# Patient Record
Sex: MALE | Race: WHITE | NOT HISPANIC OR LATINO | URBAN - METROPOLITAN AREA
[De-identification: names, ages, dates, MRNs, and addresses within clinical notes are randomized per-mention and may not be internally consistent; named-entity substitution may affect disease eponyms.]

---

## 2017-11-02 ENCOUNTER — EMERGENCY (EMERGENCY)
Facility: HOSPITAL | Age: 82
LOS: 1 days | Discharge: HOME | End: 2017-11-02
Attending: EMERGENCY MEDICINE | Admitting: EMERGENCY MEDICINE
Payer: COMMERCIAL

## 2017-11-02 VITALS
HEART RATE: 78 BPM | SYSTOLIC BLOOD PRESSURE: 182 MMHG | TEMPERATURE: 98 F | DIASTOLIC BLOOD PRESSURE: 80 MMHG | OXYGEN SATURATION: 98 % | RESPIRATION RATE: 16 BRPM

## 2017-11-02 LAB
ALBUMIN SERPL ELPH-MCNC: 3.9 G/DL — SIGNIFICANT CHANGE UP (ref 3.3–5)
ALP SERPL-CCNC: 76 U/L — SIGNIFICANT CHANGE UP (ref 40–120)
ALT FLD-CCNC: 12 U/L RC — SIGNIFICANT CHANGE UP (ref 10–45)
ANION GAP SERPL CALC-SCNC: 15 MMOL/L — SIGNIFICANT CHANGE UP (ref 5–17)
APPEARANCE UR: ABNORMAL
AST SERPL-CCNC: 21 U/L — SIGNIFICANT CHANGE UP (ref 10–40)
BACTERIA # UR AUTO: ABNORMAL /HPF
BASOPHILS # BLD AUTO: 0.3 K/UL — HIGH (ref 0–0.2)
BILIRUB SERPL-MCNC: 0.2 MG/DL — SIGNIFICANT CHANGE UP (ref 0.2–1.2)
BILIRUB UR-MCNC: NEGATIVE — SIGNIFICANT CHANGE UP
BUN SERPL-MCNC: 33 MG/DL — HIGH (ref 7–23)
CALCIUM SERPL-MCNC: 9.2 MG/DL — SIGNIFICANT CHANGE UP (ref 8.4–10.5)
CHLORIDE SERPL-SCNC: 101 MMOL/L — SIGNIFICANT CHANGE UP (ref 96–108)
CO2 SERPL-SCNC: 23 MMOL/L — SIGNIFICANT CHANGE UP (ref 22–31)
COLOR SPEC: YELLOW — SIGNIFICANT CHANGE UP
CREAT SERPL-MCNC: 2.05 MG/DL — HIGH (ref 0.5–1.3)
DIFF PNL FLD: ABNORMAL
EOSINOPHIL # BLD AUTO: 0.3 K/UL — SIGNIFICANT CHANGE UP (ref 0–0.5)
EOSINOPHIL NFR BLD AUTO: 1 % — SIGNIFICANT CHANGE UP (ref 0–6)
GLUCOSE SERPL-MCNC: 109 MG/DL — HIGH (ref 70–99)
GLUCOSE UR QL: NEGATIVE — SIGNIFICANT CHANGE UP
HCT VFR BLD CALC: 30.5 % — LOW (ref 39–50)
HGB BLD-MCNC: 10.3 G/DL — LOW (ref 13–17)
KETONES UR-MCNC: NEGATIVE — SIGNIFICANT CHANGE UP
LEUKOCYTE ESTERASE UR-ACNC: ABNORMAL
LYMPHOCYTES # BLD AUTO: 55 % — HIGH (ref 13–44)
LYMPHOCYTES # BLD AUTO: 9.2 K/UL — HIGH (ref 1–3.3)
MCHC RBC-ENTMCNC: 33.4 PG — SIGNIFICANT CHANGE UP (ref 27–34)
MCHC RBC-ENTMCNC: 33.9 GM/DL — SIGNIFICANT CHANGE UP (ref 32–36)
MCV RBC AUTO: 98.5 FL — SIGNIFICANT CHANGE UP (ref 80–100)
MONOCYTES # BLD AUTO: 1 K/UL — HIGH (ref 0–0.9)
MONOCYTES NFR BLD AUTO: 3 % — SIGNIFICANT CHANGE UP (ref 2–14)
NEUTROPHILS # BLD AUTO: 5.5 K/UL — SIGNIFICANT CHANGE UP (ref 1.8–7.4)
NEUTROPHILS NFR BLD AUTO: 35 % — LOW (ref 43–77)
NITRITE UR-MCNC: NEGATIVE — SIGNIFICANT CHANGE UP
PH UR: 6 — SIGNIFICANT CHANGE UP (ref 5–8)
PLATELET # BLD AUTO: 396 K/UL — SIGNIFICANT CHANGE UP (ref 150–400)
POTASSIUM SERPL-MCNC: 4.1 MMOL/L — SIGNIFICANT CHANGE UP (ref 3.5–5.3)
POTASSIUM SERPL-SCNC: 4.1 MMOL/L — SIGNIFICANT CHANGE UP (ref 3.5–5.3)
PROT SERPL-MCNC: 7 G/DL — SIGNIFICANT CHANGE UP (ref 6–8.3)
PROT UR-MCNC: 100 MG/DL
RBC # BLD: 3.09 M/UL — LOW (ref 4.2–5.8)
RBC # FLD: 12.3 % — SIGNIFICANT CHANGE UP (ref 10.3–14.5)
RBC CASTS # UR COMP ASSIST: ABNORMAL /HPF (ref 0–2)
SODIUM SERPL-SCNC: 139 MMOL/L — SIGNIFICANT CHANGE UP (ref 135–145)
SP GR SPEC: 1.01 — SIGNIFICANT CHANGE UP (ref 1.01–1.02)
UROBILINOGEN FLD QL: NEGATIVE — SIGNIFICANT CHANGE UP
WBC # BLD: 16.3 K/UL — HIGH (ref 3.8–10.5)
WBC # FLD AUTO: 16.3 K/UL — HIGH (ref 3.8–10.5)
WBC UR QL: ABNORMAL /HPF (ref 0–5)

## 2017-11-02 PROCEDURE — 99284 EMERGENCY DEPT VISIT MOD MDM: CPT

## 2017-11-02 RX ORDER — TAMSULOSIN HYDROCHLORIDE 0.4 MG/1
1 CAPSULE ORAL
Qty: 14 | Refills: 0 | OUTPATIENT
Start: 2017-11-02 | End: 2017-11-16

## 2017-11-02 RX ORDER — CEPHALEXIN 500 MG
1 CAPSULE ORAL
Qty: 28 | Refills: 0 | OUTPATIENT
Start: 2017-11-02 | End: 2017-11-16

## 2017-11-02 RX ORDER — TAMSULOSIN HYDROCHLORIDE 0.4 MG/1
0.4 CAPSULE ORAL AT BEDTIME
Qty: 0 | Refills: 0 | Status: DISCONTINUED | OUTPATIENT
Start: 2017-11-02 | End: 2017-11-11

## 2017-11-02 RX ADMIN — TAMSULOSIN HYDROCHLORIDE 0.4 MILLIGRAM(S): 0.4 CAPSULE ORAL at 20:14

## 2017-11-02 NOTE — ED ADULT NURSE NOTE - OBJECTIVE STATEMENT
88 yo m pmh of HTN, hypothyroidism, HLD, prostate CA came to ED c/o dysuria starting at 4 pm this afternoon.  As per daughter, pt has been having problem of dribbling, incontinence, and dysuria when trying to void for a month, had a diagnosis of UTI and on medication for about two weeks, and had lost 7 lbs over the past month. 89 yo m pmh of HTN, hypothyroidism, HLD, prostate CA with seed implant procedure came to ED c/o dysuria starting at 4 pm this afternoon.  As per daughter, pt has been having problem of dribbling, incontinence, and dysuria when trying to void for a month, had a diagnosis of UTI and on medication for about two weeks, and had lost 7 lbs over the past month.  Denies CP, back pain, SOB, fevers/chills, n/v/d, changes in bowel habits.  A&Ox4, abdomen soft, nondistended, nontender.  Bowel sounds present x 4 quadrants.  Lungs clear.  skin w/d/i.  safety and comfort maintained.  Daughter present at bedside.  Will continue to monitor.

## 2017-11-02 NOTE — ED PROVIDER NOTE - ATTENDING CONTRIBUTION TO CARE
88yo M with h/o HTN HLD prostate CA c/o difficulty urinating x 1 month; on ABx, no longer taking flomax.  Well appearing, PVR by bladder scan 0.  Likely cystitis; stable for dc with continued w/u as outpatient.

## 2017-11-02 NOTE — ED ADULT NURSE REASSESSMENT NOTE - NS ED NURSE REASSESS COMMENT FT1
Patient attempted to void, small amount of urine collected and sent to lab for UA. Post void bladder scan showed 0cc urine. Pt still feels the need to void. Family at bedside requesting to go home and follow up with urologist on Monday. MD Garcia aware and speaking with patient and family about plan of care.

## 2017-11-02 NOTE — ED PROVIDER NOTE - NS ED ROS FT
Constitutional: no fever, no chills.  Eyes: no visual changes.  ENMT: no sore throat.  CV: no chest pain.  Resp: no cough, no shortness of breath.  GI: +abdominal pain, no nausea, no vomiting, no diarrhea.  : +dysuria, no hematuria.  MSK: no back pain, no neck pain.  Skin: no rashes.  Neuro: no headache, no loss of consciousness, no weakness, no numbness, no tingling.  Psych: no known mental health issues.  Endo: no diabetes, +thyroid trouble.

## 2017-11-02 NOTE — ED PROVIDER NOTE - PROGRESS NOTE DETAILS
Resident: bladder scan negative x 2. Patient voiding freely here in department, though does have incontinence. Will dc with flomax, keflex, x 2wks, will follow-up with his urologist on Monday.

## 2017-11-02 NOTE — ED PROVIDER NOTE - OBJECTIVE STATEMENT
Resident: 89y M PMH HTN, HLD, hypothyroid, prostate ca s/p seed implant 10yrs ago presents with urinary retention x 1 month. Patient reports he cannot urinate when he has the urge. Patient has urinary incontinence overnight. Went to PMD on Tuesday, diagnosed with UTI, started on cipro. Endorses 7lb weight loss over the past month. Patient used to be on flomax but is no longer taking it. Had appt with Uro on Monday.    PMD Steffany Hernandez; Uro Nish Maddox ([place])

## 2017-11-02 NOTE — ED PROVIDER NOTE - PHYSICAL EXAMINATION
Resident: Gen: thin, elderly, no acute distress; Head: NC, AT; ENT: PERRL, dry mucous membranes; Chest: CTAB, no retractions, rate normal, appears to breathe comfortably; Heart: RRR S1S2 No peripheral edema b/l pulses 2+ in arms and legs; Abd: Soft non-tender, no rebound or guarding, +suprapubic mass; Back: No spinal deformity; Ext: Moving all 4 extremities without obvious impairment to ROM, no obvious weakness; Neuro: fluid speech; Psych: No anxiety, depression or pressured speech noted; Skin: no urticaria, no diffuse rash.

## 2017-11-02 NOTE — ED ADULT NURSE NOTE - CHPI ED SYMPTOMS NEG
no fever/no diarrhea/no vomiting/no blood in stool/no nausea/no hematuria/no abdominal distension/no chills

## 2017-11-02 NOTE — ED ADULT NURSE REASSESSMENT NOTE - NS ED NURSE REASSESS COMMENT FT1
Bladder scan performed.  Pt tolerated well.  scan showed 0 cc rentention.  MD Garcia made aware and d/c indwelling catheter.  Will continue to monitor. Bladder scan performed.  Pt tolerated well.  scan showed 0 cc rentention.  MD Garcia made aware and d/c order for indwelling catheter.  Pt provided with urine cup, and aware of need for sample. Will continue to monitor.

## 2017-11-03 VITALS
RESPIRATION RATE: 16 BRPM | OXYGEN SATURATION: 99 % | TEMPERATURE: 98 F | DIASTOLIC BLOOD PRESSURE: 76 MMHG | HEART RATE: 67 BPM | SYSTOLIC BLOOD PRESSURE: 130 MMHG

## 2017-11-03 PROCEDURE — 87086 URINE CULTURE/COLONY COUNT: CPT

## 2017-11-03 PROCEDURE — 99283 EMERGENCY DEPT VISIT LOW MDM: CPT | Mod: 25

## 2017-11-03 PROCEDURE — 80053 COMPREHEN METABOLIC PANEL: CPT

## 2017-11-03 PROCEDURE — 81001 URINALYSIS AUTO W/SCOPE: CPT

## 2017-11-03 PROCEDURE — 85027 COMPLETE CBC AUTOMATED: CPT

## 2017-11-04 ENCOUNTER — INPATIENT (INPATIENT)
Facility: HOSPITAL | Age: 82
LOS: 4 days | Discharge: HOME HEALTH | DRG: 872 | End: 2017-11-09
Attending: INTERNAL MEDICINE | Admitting: INTERNAL MEDICINE
Payer: COMMERCIAL

## 2017-11-04 VITALS
DIASTOLIC BLOOD PRESSURE: 50 MMHG | SYSTOLIC BLOOD PRESSURE: 136 MMHG | TEMPERATURE: 98 F | RESPIRATION RATE: 19 BRPM | HEART RATE: 73 BPM | OXYGEN SATURATION: 100 %

## 2017-11-04 DIAGNOSIS — R55 SYNCOPE AND COLLAPSE: ICD-10-CM

## 2017-11-04 LAB
ALBUMIN SERPL ELPH-MCNC: 3.7 G/DL — SIGNIFICANT CHANGE UP (ref 3.3–5)
ALP SERPL-CCNC: 71 U/L — SIGNIFICANT CHANGE UP (ref 40–120)
ALT FLD-CCNC: 12 U/L RC — SIGNIFICANT CHANGE UP (ref 10–45)
ANION GAP SERPL CALC-SCNC: 16 MMOL/L — SIGNIFICANT CHANGE UP (ref 5–17)
AST SERPL-CCNC: 23 U/L — SIGNIFICANT CHANGE UP (ref 10–40)
BASE EXCESS BLDV CALC-SCNC: -3.3 MMOL/L — LOW (ref -2–2)
BASOPHILS # BLD AUTO: 0.2 K/UL — SIGNIFICANT CHANGE UP (ref 0–0.2)
BILIRUB SERPL-MCNC: 0.2 MG/DL — SIGNIFICANT CHANGE UP (ref 0.2–1.2)
BUN SERPL-MCNC: 40 MG/DL — HIGH (ref 7–23)
CA-I SERPL-SCNC: 1.16 MMOL/L — SIGNIFICANT CHANGE UP (ref 1.12–1.3)
CALCIUM SERPL-MCNC: 8.5 MG/DL — SIGNIFICANT CHANGE UP (ref 8.4–10.5)
CHLORIDE BLDV-SCNC: 105 MMOL/L — SIGNIFICANT CHANGE UP (ref 96–108)
CHLORIDE SERPL-SCNC: 103 MMOL/L — SIGNIFICANT CHANGE UP (ref 96–108)
CO2 BLDV-SCNC: 24 MMOL/L — SIGNIFICANT CHANGE UP (ref 22–30)
CO2 SERPL-SCNC: 21 MMOL/L — LOW (ref 22–31)
CREAT SERPL-MCNC: 2.43 MG/DL — HIGH (ref 0.5–1.3)
CULTURE RESULTS: SIGNIFICANT CHANGE UP
EOSINOPHIL # BLD AUTO: 0 K/UL — SIGNIFICANT CHANGE UP (ref 0–0.5)
GAS PNL BLDV: 137 MMOL/L — SIGNIFICANT CHANGE UP (ref 136–145)
GAS PNL BLDV: SIGNIFICANT CHANGE UP
GAS PNL BLDV: SIGNIFICANT CHANGE UP
GLUCOSE BLDV-MCNC: 117 MG/DL — HIGH (ref 70–99)
GLUCOSE SERPL-MCNC: 124 MG/DL — HIGH (ref 70–99)
HCO3 BLDV-SCNC: 23 MMOL/L — SIGNIFICANT CHANGE UP (ref 21–29)
HCT VFR BLD CALC: 26.8 % — LOW (ref 39–50)
HCT VFR BLDA CALC: 30 % — LOW (ref 39–50)
HGB BLD CALC-MCNC: 9.7 G/DL — LOW (ref 13–17)
HGB BLD-MCNC: 9.2 G/DL — LOW (ref 13–17)
LACTATE BLDV-MCNC: 2.7 MMOL/L — HIGH (ref 0.7–2)
LYMPHOCYTES # BLD AUTO: 55 % — HIGH (ref 13–44)
LYMPHOCYTES # BLD AUTO: 9.3 K/UL — HIGH (ref 1–3.3)
MCHC RBC-ENTMCNC: 33.9 PG — SIGNIFICANT CHANGE UP (ref 27–34)
MCHC RBC-ENTMCNC: 34.3 GM/DL — SIGNIFICANT CHANGE UP (ref 32–36)
MCV RBC AUTO: 99 FL — SIGNIFICANT CHANGE UP (ref 80–100)
MONOCYTES # BLD AUTO: 1.2 K/UL — HIGH (ref 0–0.9)
MONOCYTES NFR BLD AUTO: 5 % — SIGNIFICANT CHANGE UP (ref 2–14)
NEUTROPHILS # BLD AUTO: 5.4 K/UL — SIGNIFICANT CHANGE UP (ref 1.8–7.4)
NEUTROPHILS NFR BLD AUTO: 33 % — LOW (ref 43–77)
PCO2 BLDV: 48 MMHG — SIGNIFICANT CHANGE UP (ref 35–50)
PH BLDV: 7.3 — LOW (ref 7.35–7.45)
PLATELET # BLD AUTO: 354 K/UL — SIGNIFICANT CHANGE UP (ref 150–400)
PO2 BLDV: 21 MMHG — LOW (ref 25–45)
POTASSIUM BLDV-SCNC: 3.7 MMOL/L — SIGNIFICANT CHANGE UP (ref 3.5–5)
POTASSIUM SERPL-MCNC: 4.2 MMOL/L — SIGNIFICANT CHANGE UP (ref 3.5–5.3)
POTASSIUM SERPL-SCNC: 4.2 MMOL/L — SIGNIFICANT CHANGE UP (ref 3.5–5.3)
PROT SERPL-MCNC: 6.4 G/DL — SIGNIFICANT CHANGE UP (ref 6–8.3)
RBC # BLD: 2.71 M/UL — LOW (ref 4.2–5.8)
RBC # FLD: 12.3 % — SIGNIFICANT CHANGE UP (ref 10.3–14.5)
SAO2 % BLDV: 21 % — LOW (ref 67–88)
SODIUM SERPL-SCNC: 140 MMOL/L — SIGNIFICANT CHANGE UP (ref 135–145)
SPECIMEN SOURCE: SIGNIFICANT CHANGE UP
WBC # BLD: 16.2 K/UL — HIGH (ref 3.8–10.5)
WBC # FLD AUTO: 16.2 K/UL — HIGH (ref 3.8–10.5)

## 2017-11-04 PROCEDURE — 99223 1ST HOSP IP/OBS HIGH 75: CPT

## 2017-11-04 PROCEDURE — 93010 ELECTROCARDIOGRAM REPORT: CPT

## 2017-11-04 PROCEDURE — 99285 EMERGENCY DEPT VISIT HI MDM: CPT | Mod: 25

## 2017-11-04 RX ORDER — ATORVASTATIN CALCIUM 80 MG/1
10 TABLET, FILM COATED ORAL AT BEDTIME
Qty: 0 | Refills: 0 | Status: DISCONTINUED | OUTPATIENT
Start: 2017-11-04 | End: 2017-11-09

## 2017-11-04 RX ORDER — TAMSULOSIN HYDROCHLORIDE 0.4 MG/1
0.4 CAPSULE ORAL AT BEDTIME
Qty: 0 | Refills: 0 | Status: DISCONTINUED | OUTPATIENT
Start: 2017-11-04 | End: 2017-11-09

## 2017-11-04 RX ORDER — SODIUM CHLORIDE 9 MG/ML
1000 INJECTION INTRAMUSCULAR; INTRAVENOUS; SUBCUTANEOUS ONCE
Qty: 0 | Refills: 0 | Status: DISCONTINUED | OUTPATIENT
Start: 2017-11-04 | End: 2017-11-04

## 2017-11-04 RX ORDER — SODIUM CHLORIDE 9 MG/ML
1000 INJECTION INTRAMUSCULAR; INTRAVENOUS; SUBCUTANEOUS ONCE
Qty: 0 | Refills: 0 | Status: COMPLETED | OUTPATIENT
Start: 2017-11-04 | End: 2017-11-04

## 2017-11-04 RX ORDER — LEVOTHYROXINE SODIUM 125 MCG
100 TABLET ORAL DAILY
Qty: 0 | Refills: 0 | Status: DISCONTINUED | OUTPATIENT
Start: 2017-11-04 | End: 2017-11-09

## 2017-11-04 RX ORDER — CEFTRIAXONE 500 MG/1
1 INJECTION, POWDER, FOR SOLUTION INTRAMUSCULAR; INTRAVENOUS ONCE
Qty: 0 | Refills: 0 | Status: COMPLETED | OUTPATIENT
Start: 2017-11-04 | End: 2017-11-04

## 2017-11-04 RX ORDER — LATANOPROST 0.05 MG/ML
1 SOLUTION/ DROPS OPHTHALMIC; TOPICAL AT BEDTIME
Qty: 0 | Refills: 0 | Status: DISCONTINUED | OUTPATIENT
Start: 2017-11-04 | End: 2017-11-09

## 2017-11-04 RX ADMIN — SODIUM CHLORIDE 1333.33 MILLILITER(S): 9 INJECTION INTRAMUSCULAR; INTRAVENOUS; SUBCUTANEOUS at 20:02

## 2017-11-04 RX ADMIN — CEFTRIAXONE 100 GRAM(S): 500 INJECTION, POWDER, FOR SOLUTION INTRAMUSCULAR; INTRAVENOUS at 21:44

## 2017-11-04 RX ADMIN — SODIUM CHLORIDE 1000 MILLILITER(S): 9 INJECTION INTRAMUSCULAR; INTRAVENOUS; SUBCUTANEOUS at 21:41

## 2017-11-04 NOTE — H&P ADULT - NSHPLABSRESULTS_GEN_ALL_CORE
Labs personally reviewed.                        9.2    16.2  )-----------( 354      ( 04 Nov 2017 20:02 )             26.8     11-04    140  |  103  |  40<H>  ----------------------------<  124<H>  4.2   |  21<L>  |  2.43<H>    Ca    8.5      04 Nov 2017 20:02    TPro  6.4  /  Alb  3.7  /  TBili  0.2  /  DBili  x   /  AST  23  /  ALT  12  /  AlkPhos  71  11-04    CARDIAC MARKERS ( 04 Nov 2017 20:02 )  x     / 0.05 ng/mL / x     / x     / x        LIVER FUNCTIONS - ( 04 Nov 2017 20:02 )  Alb: 3.7 g/dL / Pro: 6.4 g/dL / ALK PHOS: 71 U/L / ALT: 12 U/L RC / AST: 23 U/L / GGT: x           Imaging personally reviewed.      Tracing personally reviewed. Labs personally reviewed.                        9.2    16.2  )-----------( 354      ( 04 Nov 2017 20:02 )             26.8     11-04    140  |  103  |  40<H>  ----------------------------<  124<H>  4.2   |  21<L>  |  2.43<H>    Ca    8.5      04 Nov 2017 20:02    TPro  6.4  /  Alb  3.7  /  TBili  0.2  /  DBili  x   /  AST  23  /  ALT  12  /  AlkPhos  71  11-04    CARDIAC MARKERS ( 04 Nov 2017 20:02 )  x     / 0.05 ng/mL / x     / x     / x        LIVER FUNCTIONS - ( 04 Nov 2017 20:02 )  Alb: 3.7 g/dL / Pro: 6.4 g/dL / ALK PHOS: 71 U/L / ALT: 12 U/L RC / AST: 23 U/L / GGT: x           Imaging personally reviewed.  NSR @ 71, first degree AV block, no ST changes, TWI   Tracing personally reviewed.

## 2017-11-04 NOTE — ED PROVIDER NOTE - CARE PLAN
Principal Discharge DX:	Syncope, unspecified syncope type  Secondary Diagnosis:	Pyelonephritis  Secondary Diagnosis:	Lactic acid acidosis

## 2017-11-04 NOTE — ED PROVIDER NOTE - PROGRESS NOTE DETAILS
Jonathan Weil, PGY1 - Slightly more hypotensive, given additional 1L NS, ceftriaxone empirically for possible pyelo/sepsis. VBG obtained - Lactate 2.7. PRANAV noted. Will admit to med w/ tele ([facility]) for pyelo w/ sepsis and syncope eval. Jonathan Weil, PGY1 - Slightly more hypotensive, given additional 1L NS, ceftriaxone empirically for possible pyelo/sepsis. VBG obtained - Lactate 2.7. PRANAV noted. HD stable after IV fluids. Will admit to med w/ tele ([facility]) for pyelo w/ sepsis and syncope eval. Jonathan Weil, PGY1 - Spoke w/ Dr. Jackson (on call nephrology) at request of [facility] hospitalist. He is aware and nephrology w/ consult inpatient.

## 2017-11-04 NOTE — H&P ADULT - PROBLEM SELECTOR PLAN 4
--suspect pre-renal in setting of infection, may be some component of post-renal given prolonged obstructive symptoms  --will re-check post-void residual, consider deshpande  --continuing flomax  --trend Cr, avoid nephrotoxins, urine Na, Cr

## 2017-11-04 NOTE — H&P ADULT - NSHPPHYSICALEXAM_GEN_ALL_CORE
GENERAL: NAD, well-developed  HEAD:  atraumatic, normocephalic  ENT: EOMI, PERRLA, conjunctiva and sclera clear, neck supple, no JVD, moist mucosa, no LAD, no thyromegaly  CHEST/LUNG: CTAB; no wheezes, rales, rhonchi  BACK: no spinal tenderness  HEART: RRR, no murmurs, rubs, or gallops  ABDOMEN: NABS, soft, nontender, nondistended  EXTREMITIES:  no clubbing, cyanosis, or edema  PSYCH: normal behavior, normal affect, euthymic  NEUROLOGY: AAOx3, non-focal, CN 2-12 intact  SKIN: normal color, no rashes or lesions

## 2017-11-04 NOTE — ED PROVIDER NOTE - OBJECTIVE STATEMENT
89M hx HTN/hypothyroidism/prostate ca BIBA for 1x syncope immediately pta. Pt was sitting eating dinner when he slumped backwards in his chair and appeared to shake for several seconds, unresponsive to external stimuli during this time. Came to after ~1 minute, no post-ictal period. Pt states he felt light-headed beforehand, but otherwise denies precedent symptoms, including chest pain/dyspnea/headache/abd pain/palpitations. Seen by his PCP 4 days ago for urinary retention and frequency, dx UTI and started on cipro. Presented to this ED 2 days ago for persistent symptoms, abx changed to keflex and started flomax. No hx syncopal events.

## 2017-11-04 NOTE — H&P ADULT - HISTORY OF PRESENT ILLNESS
This is an 89 year old gentleman with history of HTN/hypothyroidism/prostate CA BIBA for syncopal episode. Patient was sitting eating dinner when he slumped backwards in his chair and appeared to shake for several seconds, unresponsive to external stimuli during this time. Came to after ~1 minute, no post-ictal period. Pt states he felt light-headed beforehand, but otherwise denies precedent symptoms, including chest pain/dyspnea/headache/abd pain/palpitations. Seen by his PCP 4 days ago for urinary retention and frequency, dx UTI and started on cipro. Presented to this ED 2 days ago for persistent symptoms, abx changed to keflex and started flomax. No hx syncopal events.    In ED, Tmax 97.7, HR 63, /54 - 136/58, satting well on RA.  Labs notable for WBC 16.2, Hgb 9.2, Plt 354, Na 140, bicarb 21, Cr 2.43, , troponin 0.05, VBG 7.3/48, lactate 2.7. Received ceftriaxone, 3L NS. This is an 89 year old gentleman with history of HTN/hypothyroidism/prostate CA BIBA for syncopal episode. Patient is a poor historian, and provider unable to gain collateral from daughter. Per patient and chart biopsy, he was in [facility]  today.  Patient was sitting eating dinner when he slumped backwards in his chair and appeared to shake for several seconds, unresponsive to external stimuli during this time. Came to after ~1 minute, no post-ictal period. Patient stated that he felt light-headed beforehand, but otherwise denies flank pain, dysuria, chest pain, palpitations, fevers, chills, sore throat, runny nose, diarrhea, constipation. In recent history, patient was seen by PMD 4 days ago for urinary retention and frequency. He was diagnosed with UTI and started on cipro. He then presented to [facility] ED 2 days ago for persistent symptoms, antibiotics changed to keflex and flomax started, bladder scan negative x 2.  On my exam, patient without complaints.      In ambulance, patient was noted to be hypotensive.  On arrival to ED, Tmax 97.7, HR 63, /54 - 136/58, satting well on RA.  Labs notable for WBC 16.2, Hgb 9.2, Plt 354, Na 140, bicarb 21, Cr 2.43, , troponin 0.05, VBG 7.3/48, lactate 2.7. Received ceftriaxone, 3L NS.

## 2017-11-04 NOTE — ED PROVIDER NOTE - MEDICAL DECISION MAKING DETAILS
DDx: arrhythmia, brugada's, WPW, prolonged QT, medication-induced syncope. Plan - ECG, cbc, cmp, ua/culture, rehydration

## 2017-11-04 NOTE — H&P ADULT - PROBLEM SELECTOR PLAN 1
--unclear etiology, suspect vasovagal syncope in setting of infection, less concern for cardiac event/seizure  --neuro exam benign  --orthostatic vitals, fluids as below  --continue telemetry

## 2017-11-04 NOTE — ED PROVIDER NOTE - ATTENDING CONTRIBUTION TO CARE
Attending Note (Tesha): patient with syncope at home, witnessed by daugther, happened while eating, daughter denies any choking episodes.  at baseline in ED. also recent uti.  noted to be hypotensive in field by ems.  concern for sepsis as cause vs orthostatic from flomax.  abx, cultures, admit.

## 2017-11-04 NOTE — H&P ADULT - PROBLEM SELECTOR PLAN 2
--continue ceftriaxone  --no clinical signs of pyelo (no flank pain, fevers, nausea, vomiting)  --NS @ 125 overnight  --repeat UA, f/u urine cultures

## 2017-11-04 NOTE — H&P ADULT - ASSESSMENT
This is an 89 year old gentleman with history of HTN/hypothyroidism/prostate CA BIBA for syncopal episode.

## 2017-11-04 NOTE — H&P ADULT - PROBLEM SELECTOR PLAN 3
--now improved s/p 3L NS  --suspect related to UTI  --trend q4h vitals  --holding home antihypertensives --now improved s/p 3L NS  --suspect related to UTI  --trend q4h vitals  --holding home antihypertensives, please reconcile meds in AM

## 2017-11-05 DIAGNOSIS — E03.9 HYPOTHYROIDISM, UNSPECIFIED: ICD-10-CM

## 2017-11-05 DIAGNOSIS — N39.0 URINARY TRACT INFECTION, SITE NOT SPECIFIED: ICD-10-CM

## 2017-11-05 DIAGNOSIS — E78.5 HYPERLIPIDEMIA, UNSPECIFIED: ICD-10-CM

## 2017-11-05 DIAGNOSIS — R55 SYNCOPE AND COLLAPSE: ICD-10-CM

## 2017-11-05 DIAGNOSIS — I95.9 HYPOTENSION, UNSPECIFIED: ICD-10-CM

## 2017-11-05 DIAGNOSIS — Z29.9 ENCOUNTER FOR PROPHYLACTIC MEASURES, UNSPECIFIED: ICD-10-CM

## 2017-11-05 DIAGNOSIS — N17.9 ACUTE KIDNEY FAILURE, UNSPECIFIED: ICD-10-CM

## 2017-11-05 DIAGNOSIS — R63.8 OTHER SYMPTOMS AND SIGNS CONCERNING FOOD AND FLUID INTAKE: ICD-10-CM

## 2017-11-05 LAB
ANION GAP SERPL CALC-SCNC: 18 MMOL/L — HIGH (ref 5–17)
BASE EXCESS BLDV CALC-SCNC: -3.2 MMOL/L — LOW (ref -2–2)
BASOPHILS # BLD AUTO: 0 K/UL — SIGNIFICANT CHANGE UP (ref 0–0.2)
BASOPHILS NFR BLD AUTO: 0 % — SIGNIFICANT CHANGE UP (ref 0–2)
BUN SERPL-MCNC: 36 MG/DL — HIGH (ref 7–23)
CA-I SERPL-SCNC: 1.17 MMOL/L — SIGNIFICANT CHANGE UP (ref 1.12–1.3)
CALCIUM SERPL-MCNC: 8.3 MG/DL — LOW (ref 8.4–10.5)
CHLORIDE BLDV-SCNC: 108 MMOL/L — SIGNIFICANT CHANGE UP (ref 96–108)
CHLORIDE SERPL-SCNC: 106 MMOL/L — SIGNIFICANT CHANGE UP (ref 96–108)
CHLORIDE UR-SCNC: 105 MMOL/L — SIGNIFICANT CHANGE UP
CO2 BLDV-SCNC: 23 MMOL/L — SIGNIFICANT CHANGE UP (ref 22–30)
CO2 SERPL-SCNC: 15 MMOL/L — LOW (ref 22–31)
CREAT ?TM UR-MCNC: 60 MG/DL — SIGNIFICANT CHANGE UP
CREAT ?TM UR-MCNC: 62 MG/DL — SIGNIFICANT CHANGE UP
CREAT SERPL-MCNC: 1.83 MG/DL — HIGH (ref 0.5–1.3)
EOSINOPHIL # BLD AUTO: 0.24 K/UL — SIGNIFICANT CHANGE UP (ref 0–0.5)
EOSINOPHIL NFR BLD AUTO: 2 % — SIGNIFICANT CHANGE UP (ref 0–6)
GAS PNL BLDV: 137 MMOL/L — SIGNIFICANT CHANGE UP (ref 136–145)
GAS PNL BLDV: SIGNIFICANT CHANGE UP
GAS PNL BLDV: SIGNIFICANT CHANGE UP
GLUCOSE BLDV-MCNC: 99 MG/DL — SIGNIFICANT CHANGE UP (ref 70–99)
GLUCOSE SERPL-MCNC: 82 MG/DL — SIGNIFICANT CHANGE UP (ref 70–99)
HCO3 BLDV-SCNC: 22 MMOL/L — SIGNIFICANT CHANGE UP (ref 21–29)
HCT VFR BLD CALC: 26.3 % — LOW (ref 39–50)
HCT VFR BLDA CALC: 26 % — LOW (ref 39–50)
HGB BLD CALC-MCNC: 8.5 G/DL — LOW (ref 13–17)
HGB BLD-MCNC: 8.5 G/DL — LOW (ref 13–17)
LACTATE BLDV-MCNC: 0.9 MMOL/L — SIGNIFICANT CHANGE UP (ref 0.7–2)
LYMPHOCYTES # BLD AUTO: 51 % — HIGH (ref 13–44)
LYMPHOCYTES # BLD AUTO: 6.2 K/UL — HIGH (ref 1–3.3)
MCHC RBC-ENTMCNC: 31.5 PG — SIGNIFICANT CHANGE UP (ref 27–34)
MCHC RBC-ENTMCNC: 32.3 GM/DL — SIGNIFICANT CHANGE UP (ref 32–36)
MCV RBC AUTO: 97.4 FL — SIGNIFICANT CHANGE UP (ref 80–100)
MONOCYTES # BLD AUTO: 0.85 K/UL — SIGNIFICANT CHANGE UP (ref 0–0.9)
MONOCYTES NFR BLD AUTO: 7 % — SIGNIFICANT CHANGE UP (ref 2–14)
NEUTROPHILS # BLD AUTO: 4.62 K/UL — SIGNIFICANT CHANGE UP (ref 1.8–7.4)
NEUTROPHILS NFR BLD AUTO: 38 % — LOW (ref 43–77)
OTHER CELLS CSF MANUAL: 9 ML/DL — LOW (ref 18–22)
PCO2 BLDV: 42 MMHG — SIGNIFICANT CHANGE UP (ref 35–50)
PH BLDV: 7.33 — LOW (ref 7.35–7.45)
PLATELET # BLD AUTO: 314 K/UL — SIGNIFICANT CHANGE UP (ref 150–400)
PO2 BLDV: 48 MMHG — HIGH (ref 25–45)
POTASSIUM BLDV-SCNC: 3.6 MMOL/L — SIGNIFICANT CHANGE UP (ref 3.5–5)
POTASSIUM SERPL-MCNC: 4.1 MMOL/L — SIGNIFICANT CHANGE UP (ref 3.5–5.3)
POTASSIUM SERPL-SCNC: 4.1 MMOL/L — SIGNIFICANT CHANGE UP (ref 3.5–5.3)
POTASSIUM UR-SCNC: 16 MMOL/L — SIGNIFICANT CHANGE UP
PROT ?TM UR-MCNC: 99 MG/DL — HIGH (ref 0–12)
PROT/CREAT UR-RTO: 1.6 RATIO — HIGH (ref 0–0.2)
RBC # BLD: 2.7 M/UL — LOW (ref 4.2–5.8)
RBC # FLD: 14.2 % — SIGNIFICANT CHANGE UP (ref 10.3–14.5)
SAO2 % BLDV: 79 % — SIGNIFICANT CHANGE UP (ref 67–88)
SODIUM SERPL-SCNC: 139 MMOL/L — SIGNIFICANT CHANGE UP (ref 135–145)
SODIUM UR-SCNC: 105 MMOL/L — SIGNIFICANT CHANGE UP
SODIUM UR-SCNC: 106 MMOL/L — SIGNIFICANT CHANGE UP
WBC # BLD: 12.16 K/UL — HIGH (ref 3.8–10.5)
WBC # FLD AUTO: 12.16 K/UL — HIGH (ref 3.8–10.5)

## 2017-11-05 RX ORDER — HEPARIN SODIUM 5000 [USP'U]/ML
5000 INJECTION INTRAVENOUS; SUBCUTANEOUS EVERY 12 HOURS
Qty: 0 | Refills: 0 | Status: DISCONTINUED | OUTPATIENT
Start: 2017-11-05 | End: 2017-11-09

## 2017-11-05 RX ORDER — CEFTRIAXONE 500 MG/1
1 INJECTION, POWDER, FOR SOLUTION INTRAMUSCULAR; INTRAVENOUS EVERY 24 HOURS
Qty: 0 | Refills: 0 | Status: DISCONTINUED | OUTPATIENT
Start: 2017-11-05 | End: 2017-11-07

## 2017-11-05 RX ORDER — INFLUENZA VIRUS VACCINE 15; 15; 15; 15 UG/.5ML; UG/.5ML; UG/.5ML; UG/.5ML
0.5 SUSPENSION INTRAMUSCULAR ONCE
Qty: 0 | Refills: 0 | Status: DISCONTINUED | OUTPATIENT
Start: 2017-11-05 | End: 2017-11-09

## 2017-11-05 RX ORDER — SODIUM CHLORIDE 9 MG/ML
1000 INJECTION INTRAMUSCULAR; INTRAVENOUS; SUBCUTANEOUS
Qty: 0 | Refills: 0 | Status: DISCONTINUED | OUTPATIENT
Start: 2017-11-05 | End: 2017-11-06

## 2017-11-05 RX ADMIN — SODIUM CHLORIDE 125 MILLILITER(S): 9 INJECTION INTRAMUSCULAR; INTRAVENOUS; SUBCUTANEOUS at 17:16

## 2017-11-05 RX ADMIN — LATANOPROST 1 DROP(S): 0.05 SOLUTION/ DROPS OPHTHALMIC; TOPICAL at 21:40

## 2017-11-05 RX ADMIN — CEFTRIAXONE 100 GRAM(S): 500 INJECTION, POWDER, FOR SOLUTION INTRAMUSCULAR; INTRAVENOUS at 21:40

## 2017-11-05 RX ADMIN — SODIUM CHLORIDE 125 MILLILITER(S): 9 INJECTION INTRAMUSCULAR; INTRAVENOUS; SUBCUTANEOUS at 21:40

## 2017-11-05 RX ADMIN — Medication 100 MICROGRAM(S): at 06:05

## 2017-11-05 RX ADMIN — TAMSULOSIN HYDROCHLORIDE 0.4 MILLIGRAM(S): 0.4 CAPSULE ORAL at 21:41

## 2017-11-05 RX ADMIN — HEPARIN SODIUM 5000 UNIT(S): 5000 INJECTION INTRAVENOUS; SUBCUTANEOUS at 17:13

## 2017-11-05 RX ADMIN — ATORVASTATIN CALCIUM 10 MILLIGRAM(S): 80 TABLET, FILM COATED ORAL at 21:41

## 2017-11-05 RX ADMIN — HEPARIN SODIUM 5000 UNIT(S): 5000 INJECTION INTRAVENOUS; SUBCUTANEOUS at 06:05

## 2017-11-05 NOTE — CONSULT NOTE ADULT - SUBJECTIVE AND OBJECTIVE BOX
HPI:  Mr. Branker is an 89 year-old man with history of multiple medical problems including hypertension, hypothyroidism, and prostate cancer. He presented last night to the [facility] ER s/p loss of consciousness; he was sitting and eating dinner when he slumped back in his chair and appeared to shake for several seconds; he was unresponsive to external stimuli at this time; he regained consciousness approximately 1 minute later. He attested to lightheadedness directly prior to the episode. He was noted to be hypotensive in the ambulance. Of note, 4 days prior to admission, he saw his PCP for urinary frequency/hesitancy; he was placed on Cipro for a presumed UTI; he presented to the [facility] ER 2 days prior to admission for persistent symptoms; he was switched there from Cipro to Keflex and was placed on Flomax. He underwent a bladder scan during his 1st visit to the ER; it was negative for retention.    Upon admission, it was noted that his BUN/creatinine were elevated from baseline; therefore a renal consultation was requested.     In the ER, he received 2L of NS in boluses; he is now on NS at 125cc/h      PAST MEDICAL & SURGICAL HISTORY:  Hyperlipidemia  Basal cell carcinoma of skin  Essential hypertension  Hypothyroid  No significant past surgical history    MEDICATIONS  (STANDING):  atorvastatin 10 milliGRAM(s) Oral at bedtime  cefTRIAXone   IVPB 1 Gram(s) IV Intermittent every 24 hours  heparin  Injectable 5000 Unit(s) SubCutaneous every 12 hours  influenza   Vaccine 0.5 milliLiter(s) IntraMuscular once  latanoprost 0.005% Ophthalmic Solution 1 Drop(s) Both EYES at bedtime  levothyroxine 100 MICROGram(s) Oral daily  sodium chloride 0.9%. 1000 milliLiter(s) (125 mL/Hr) IV Continuous <Continuous>  tamsulosin 0.4 milliGRAM(s) Oral at bedtime    Allergies  No Known Allergies    SOCIAL HISTORY:  Denies ETOh,Smoking,     FAMILY HISTORY:  Family history of essential hypertension    REVIEW OF SYSTEMS:  CONSTITUTIONAL: No weakness, fevers or chills; (+)LOC  EYES/ENT: No visual changes;  No vertigo or throat pain   NECK: No pain or stiffness  RESPIRATORY: No cough, wheezing, hemoptysis; No shortness of breath  CARDIOVASCULAR: No chest pain or palpitations; (+)dizziness  GASTROINTESTINAL: No abdominal or epigastric pain. No nausea, vomiting, or hematemesis; No diarrhea or constipation. No melena or hematochezia.  GENITOURINARY: (+)hesitancy, (+)frequency, (+)reduced urine output  NEUROLOGICAL: No numbness or weakness  SKIN: No itching, burning, rashes, or lesions   All other review of systems is negative unless indicated above.    VITAL:  T(C): , Max: 36.9 (11-05-17 @ 00:26)  T(F): , Max: 98.4 (11-05-17 @ 00:26)  HR: 54 (11-05-17 @ 03:46)  BP: 116/74 (11-05-17 @ 00:26)  BP(mean): --  RR: 18 (11-05-17 @ 03:46)  SpO2: 97% (11-05-17 @ 03:46)  Wt(kg): --    PHYSICAL EXAM:  Constitutional: NAD, Alert  HEENT: NCAT, MMM  Neck: Supple, No JVD  Respiratory: CTA-b/l  Cardiovascular: RRR s1s2, no m/r/g  Gastrointestinal: BS+, soft, NT/ND  Extremities: No peripheral edema b/l  Neurological: no focal deficits; strength grossly intact  Psychiatric: Normal mood, normal affect  Back: no CVAT b/l  Skin: No rashes, no nevi    LABS:                        9.2    16.2  )-----------( 354      ( 04 Nov 2017 20:02 )             26.8     Na(140)/K(4.2)/Cl(103)/HCO3(21)/BUN(40)/Cr(2.43)Glu(124)/Ca(8.5)/Mg(--)/PO4(--)    11-04 @ 20:02  Na(139)/K(4.1)/Cl(101)/HCO3(23)/BUN(33)/Cr(2.05)Glu(109)/Ca(9.2)/Mg(--)/PO4(--)    11-02 @ 20:08    (6/2016) - BUN/creatinine 31/1.08    Urinalysis + Microscopic Examination (11.02.17 @ 22:46)    Glucose Qualitative, Urine: Negative    Blood, Urine: Moderate    Urine Appearance: Turbid    Bilirubin: Negative    Color: Yellow    Urobilinogen: Negative    Specific Gravity: 1.015    Protein, Urine: 100 mg/dL    pH Urine: 6.0    Leukocyte Esterase Concentration: Moderate    Nitrite: Negative    Ketone - Urine: Negative    Red Blood Cell - Urine: 5-10 /HPF    White Blood Cell - Urine: 10-25 /HPF    Bacteria: Few /HPF      IMAGING:    < from: Xray Chest 2 Views PA/Lat (10.09.15 @ 15:46) >  IMPRESSION: Since 10/8/15 study, new trace left pleural effusion has   developed. No clear-cut bilateral consolidations.    ASSESSMENT:    (1)Renal - PRANAV - likely prerenally mediated in association with UTI. Labs this a.m. are pending; hopefully we see some improvement in the renal parameters, now that he has received a substantial amount of isotonic IVF. He does have proteinuria based on his urinalysis from 10/2/17 - sometimes we see this simply in the setting of a UTI, but this is a new finding relative to 2016 and we should rule out that this is a true/chronic issue for him at this point. Can simply check a urine protein/creatinine for now, before considering further workup for glomerulonephritis.    (2)CV - hypovolemic on presentation - s/p 2L NS boluses and on NS at 125cc/h.     (3)ID - UTI - on Rocephin    (4)Syncope - likely due to hypovolemia/hypotension in association with sepsis      RECOMMEND:  (1)IVF as ordered for now  (2)Follow up renal ultrasound  (3)Check urine protein/creatinine ratio; also check urine lytes and creatinine  (4)BMP daily  (5)Antibiotics per primary team; dose for GFR 20-30ml/min (present dosing is acceptable)        Thank you for involving [facility] in this patient's care.    With warm regards,    Henrique Jackson MD   [facility]  [phone] HPI:  Mr. Branker is an 89 year-old man with history of multiple medical problems including hypertension, hypothyroidism, and prostate cancer. He presented last night to the [facility] ER s/p loss of consciousness; he was sitting and eating dinner when he slumped back in his chair and appeared to shake for several seconds; he was unresponsive to external stimuli at this time; he regained consciousness approximately 1 minute later. He attested to lightheadedness directly prior to the episode. He was noted to be hypotensive in the ambulance. Of note, 4 days prior to admission, he saw his PCP for urinary frequency/hesitancy; he was placed on Cipro for a presumed UTI; he presented to the [facility] ER 2 days prior to admission for persistent symptoms; he was switched there from Cipro to Keflex and was placed on Flomax. He underwent a bladder scan during his 1st visit to the ER; it was negative for retention.    Upon admission, it was noted that his BUN/creatinine were elevated from baseline; therefore a renal consultation was requested.     In the ER, he received 2L of NS in boluses; he is now on NS at 125cc/h      PAST MEDICAL & SURGICAL HISTORY:  Hyperlipidemia  Basal cell carcinoma of skin  Essential hypertension  Hypothyroid  No significant past surgical history    MEDICATIONS  (STANDING):  atorvastatin 10 milliGRAM(s) Oral at bedtime  cefTRIAXone   IVPB 1 Gram(s) IV Intermittent every 24 hours  heparin  Injectable 5000 Unit(s) SubCutaneous every 12 hours  influenza   Vaccine 0.5 milliLiter(s) IntraMuscular once  latanoprost 0.005% Ophthalmic Solution 1 Drop(s) Both EYES at bedtime  levothyroxine 100 MICROGram(s) Oral daily  sodium chloride 0.9%. 1000 milliLiter(s) (125 mL/Hr) IV Continuous <Continuous>  tamsulosin 0.4 milliGRAM(s) Oral at bedtime    Allergies  No Known Allergies    SOCIAL HISTORY:  Denies ETOh,Smoking,     FAMILY HISTORY:  Family history of essential hypertension    REVIEW OF SYSTEMS:  CONSTITUTIONAL: No weakness, fevers or chills; (+)LOC  EYES/ENT: No visual changes;  No vertigo or throat pain   NECK: No pain or stiffness  RESPIRATORY: No cough, wheezing, hemoptysis; No shortness of breath  CARDIOVASCULAR: No chest pain or palpitations; (+)dizziness  GASTROINTESTINAL: No abdominal or epigastric pain. No nausea, vomiting, or hematemesis; No diarrhea or constipation. No melena or hematochezia.  GENITOURINARY: (+)hesitancy, (+)frequency, (+)reduced urine output  NEUROLOGICAL: No numbness or weakness  SKIN: No itching, burning, rashes, or lesions   All other review of systems is negative unless indicated above.    VITAL:  T(C): , Max: 36.9 (11-05-17 @ 00:26)  T(F): , Max: 98.4 (11-05-17 @ 00:26)  HR: 54 (11-05-17 @ 03:46)  BP: 116/74 (11-05-17 @ 00:26)  BP(mean): --  RR: 18 (11-05-17 @ 03:46)  SpO2: 97% (11-05-17 @ 03:46)  Wt(kg): --    PHYSICAL EXAM:  Constitutional: NAD, Alert, pleasant, cachectic  HEENT: NCAT, DMM  Neck: Supple, No JVD  Respiratory: CTA-b/l  Cardiovascular: RRR s1s2, no m/r/g  Gastrointestinal: BS+, soft, NT/ND  Extremities: No peripheral edema b/l  Neurological: no focal deficits; strength grossly intact  : (+)deshpande -small amount of yellowish urine in bag  Back: no CVAT b/l  Skin: No rashes, no nevi; (+)pale    LABS:                        9.2    16.2  )-----------( 354      ( 04 Nov 2017 20:02 )             26.8     Na(140)/K(4.2)/Cl(103)/HCO3(21)/BUN(40)/Cr(2.43)Glu(124)/Ca(8.5)/Mg(--)/PO4(--)    11-04 @ 20:02  Na(139)/K(4.1)/Cl(101)/HCO3(23)/BUN(33)/Cr(2.05)Glu(109)/Ca(9.2)/Mg(--)/PO4(--)    11-02 @ 20:08    (6/2016) - BUN/creatinine 31/1.08    Urinalysis + Microscopic Examination (11.02.17 @ 22:46)    Glucose Qualitative, Urine: Negative    Blood, Urine: Moderate    Urine Appearance: Turbid    Bilirubin: Negative    Color: Yellow    Urobilinogen: Negative    Specific Gravity: 1.015    Protein, Urine: 100 mg/dL    pH Urine: 6.0    Leukocyte Esterase Concentration: Moderate    Nitrite: Negative    Ketone - Urine: Negative    Red Blood Cell - Urine: 5-10 /HPF    White Blood Cell - Urine: 10-25 /HPF    Bacteria: Few /HPF      IMAGING:    < from: Xray Chest 2 Views PA/Lat (10.09.15 @ 15:46) >  IMPRESSION: Since 10/8/15 study, new trace left pleural effusion has   developed. No clear-cut bilateral consolidations.    ASSESSMENT:    (1)Renal - PRANAV - likely prerenally mediated in association with UTI. Labs this a.m. are pending; hopefully we see some improvement in the renal parameters, now that he has received a substantial amount of isotonic IVF. He does have proteinuria based on his urinalysis from 10/2/17 - sometimes we see this simply in the setting of a UTI, but this is a new finding relative to 2016 and we should rule out that this is a true/chronic issue for him at this point. Can simply check a urine protein/creatinine for now, before considering further workup for glomerulonephritis.    (2)CV - hypovolemic on presentation - s/p 2L NS boluses and on NS at 125cc/h.     (3)ID - UTI - on Rocephin    (4)Syncope - likely due to hypovolemia/hypotension in association with sepsis      RECOMMEND:  (1)IVF as ordered for now  (2)Follow up renal ultrasound  (3)Check urine protein/creatinine ratio; also check urine lytes and creatinine  (4)BMP daily  (5)Antibiotics per primary team; dose for GFR 20-30ml/min (present dosing is acceptable)        Thank you for involving [facility] in this patient's care.    With warm regards,    Henrique Jackson MD   [facility]  [phone] HPI:  Mr. Branker is an 89 year-old man with history of multiple medical problems including hypertension, hypothyroidism, and prostate cancer. He presented last night to the [facility] ER s/p loss of consciousness; he was sitting and eating dinner when he slumped back in his chair and appeared to shake for several seconds; he was unresponsive to external stimuli at this time; he regained consciousness approximately 1 minute later. He attested to lightheadedness directly prior to the episode. He was noted to be hypotensive in the ambulance. Of note, 4 days prior to admission, he saw his PCP for urinary frequency/hesitancy; he was placed on Cipro for a presumed UTI; he presented to the [facility] ER 2 days prior to admission for persistent symptoms; he was switched there from Cipro to Keflex and was placed on Flomax. He underwent a bladder scan during his 1st visit to the ER; it was negative for retention.    Mr. Branker denies history of CKD/PRANAV. He denies recent NSAID use.    Upon admission, it was noted that his BUN/creatinine were elevated from baseline; therefore a renal consultation was requested.     In the ER, he received 2L of NS in boluses; he is now on NS at 125cc/h      PAST MEDICAL & SURGICAL HISTORY:  Hyperlipidemia  Basal cell carcinoma of skin  Essential hypertension  Hypothyroid  No significant past surgical history    MEDICATIONS  (STANDING):  atorvastatin 10 milliGRAM(s) Oral at bedtime  cefTRIAXone   IVPB 1 Gram(s) IV Intermittent every 24 hours  heparin  Injectable 5000 Unit(s) SubCutaneous every 12 hours  influenza   Vaccine 0.5 milliLiter(s) IntraMuscular once  latanoprost 0.005% Ophthalmic Solution 1 Drop(s) Both EYES at bedtime  levothyroxine 100 MICROGram(s) Oral daily  sodium chloride 0.9%. 1000 milliLiter(s) (125 mL/Hr) IV Continuous <Continuous>  tamsulosin 0.4 milliGRAM(s) Oral at bedtime    Allergies  No Known Allergies    SOCIAL HISTORY:  Denies ETOh,Smoking,     FAMILY HISTORY:  Family history of essential hypertension    REVIEW OF SYSTEMS:  CONSTITUTIONAL: No weakness, fevers or chills; (+)LOC  EYES/ENT: No visual changes;  No vertigo or throat pain   NECK: No pain or stiffness  RESPIRATORY: No cough, wheezing, hemoptysis; No shortness of breath  CARDIOVASCULAR: No chest pain or palpitations; (+)dizziness  GASTROINTESTINAL: No abdominal or epigastric pain. No nausea, vomiting, or hematemesis; No diarrhea or constipation. No melena or hematochezia.  GENITOURINARY: (+)hesitancy, (+)frequency, (+)reduced urine output  NEUROLOGICAL: No numbness or weakness  SKIN: No itching, burning, rashes, or lesions   All other review of systems is negative unless indicated above.    VITAL:  T(C): , Max: 36.9 (11-05-17 @ 00:26)  T(F): , Max: 98.4 (11-05-17 @ 00:26)  HR: 54 (11-05-17 @ 03:46)  BP: 116/74 (11-05-17 @ 00:26)  BP(mean): --  RR: 18 (11-05-17 @ 03:46)  SpO2: 97% (11-05-17 @ 03:46)  Wt(kg): --    PHYSICAL EXAM:  Constitutional: NAD, Alert, pleasant, cachectic  HEENT: NCAT, DMM  Neck: Supple, No JVD  Respiratory: CTA-b/l  Cardiovascular: RRR s1s2, no m/r/g  Gastrointestinal: BS+, soft, NT/ND  Extremities: No peripheral edema b/l  Neurological: no focal deficits; strength grossly intact  : (+)deshpande -small amount of yellowish urine in bag  Back: no CVAT b/l  Skin: No rashes, no nevi; (+)pale    LABS:                        9.2    16.2  )-----------( 354      ( 04 Nov 2017 20:02 )             26.8     Na(140)/K(4.2)/Cl(103)/HCO3(21)/BUN(40)/Cr(2.43)Glu(124)/Ca(8.5)/Mg(--)/PO4(--)    11-04 @ 20:02  Na(139)/K(4.1)/Cl(101)/HCO3(23)/BUN(33)/Cr(2.05)Glu(109)/Ca(9.2)/Mg(--)/PO4(--)    11-02 @ 20:08    (6/2016) - BUN/creatinine 31/1.08    Urinalysis + Microscopic Examination (11.02.17 @ 22:46)    Glucose Qualitative, Urine: Negative    Blood, Urine: Moderate    Urine Appearance: Turbid    Bilirubin: Negative    Color: Yellow    Urobilinogen: Negative    Specific Gravity: 1.015    Protein, Urine: 100 mg/dL    pH Urine: 6.0    Leukocyte Esterase Concentration: Moderate    Nitrite: Negative    Ketone - Urine: Negative    Red Blood Cell - Urine: 5-10 /HPF    White Blood Cell - Urine: 10-25 /HPF    Bacteria: Few /HPF      IMAGING:    < from: Xray Chest 2 Views PA/Lat (10.09.15 @ 15:46) >  IMPRESSION: Since 10/8/15 study, new trace left pleural effusion has   developed. No clear-cut bilateral consolidations.    ASSESSMENT:    (1)Renal - PRANAV - likely prerenally mediated in association with UTI. Labs this a.m. are pending; hopefully we see some improvement in the renal parameters, now that he has received a substantial amount of isotonic IVF. He does have proteinuria based on his urinalysis from 10/2/17 - sometimes we see this simply in the setting of a UTI, but this is a new finding relative to 2016 and we should rule out that this is a true/chronic issue for him at this point. Can simply check a urine protein/creatinine for now, before considering further workup for glomerulonephritis.    (2)CV - hypovolemic on presentation - s/p 2L NS boluses and on NS at 125cc/h.     (3)ID - UTI - on Rocephin    (4)Syncope - likely due to hypovolemia/hypotension in association with sepsis      RECOMMEND:  (1)IVF as ordered for now  (2)Follow up renal ultrasound  (3)Check urine protein/creatinine ratio; also check urine lytes and creatinine  (4)BMP daily  (5)Antibiotics per primary team; dose for GFR 20-30ml/min (present dosing is acceptable)        Thank you for involving [facility] in this patient's care.    With warm regards,    Henrique Jackson MD   [facility]  [phone]

## 2017-11-05 NOTE — PROGRESS NOTE ADULT - SUBJECTIVE AND OBJECTIVE BOX
Patient is a 89y old  Male who presents with a chief complaint of syncope (04 Nov 2017 22:54)  pt seen and examined at bedside   SUBJECTIVE/OVERNIGHT events none  Vital Signs Last 24 Hrs  T(C): 36.8 (05 Nov 2017 11:18), Max: 36.9 (05 Nov 2017 00:26)  T(F): 98.2 (05 Nov 2017 11:18), Max: 98.4 (05 Nov 2017 00:26)  HR: 58 (05 Nov 2017 11:18) (54 - 73)  BP: 116/61 (05 Nov 2017 11:18) (102/54 - 136/50)  BP(mean): --  RR: 18 (05 Nov 2017 11:18) (16 - 19)  SpO2: 98% (05 Nov 2017 11:18) (97% - 100%)  CAPILLARY BLOOD GLUCOSE        MEDICATIONS  (STANDING):  atorvastatin 10 milliGRAM(s) Oral at bedtime  cefTRIAXone   IVPB 1 Gram(s) IV Intermittent every 24 hours  heparin  Injectable 5000 Unit(s) SubCutaneous every 12 hours  influenza   Vaccine 0.5 milliLiter(s) IntraMuscular once  latanoprost 0.005% Ophthalmic Solution 1 Drop(s) Both EYES at bedtime  levothyroxine 100 MICROGram(s) Oral daily  sodium chloride 0.9%. 1000 milliLiter(s) (125 mL/Hr) IV Continuous <Continuous>  tamsulosin 0.4 milliGRAM(s) Oral at bedtime    MEDICATIONS  (PRN):    PHYSICAL EXAM  General : comfortable, not in any acute distress  Neck : supple, no LAD, no JVD  Eyes : EOMI, PERRLA, conjunctiva : no icterus, no pallor  MM moist, no pharyngeal erythema/exudates  Pulmonary: clear to auscultation bilateral lung fields, no crackles/rhonchi/wheezing,   CVS : S1 S2,rate normal-,rhythm-regular, no audible murmurs, no abnormal sounds  Gastrointestinal: soft, non tender, non distended, no organomegaly , bowel sounds audible  Extremities: no edema  Neuro: AAOx3, no focal deficits  Musculoskeletal: no obvious limitations, FROM of all ext  Skin: no rash  LABS                        8.5    12.16 )-----------( 314      ( 05 Nov 2017 08:21 )             26.3     11-04    140  |  103  |  40<H>  ----------------------------<  124<H>  4.2   |  21<L>  |  2.43<H>    Ca    8.5      04 Nov 2017 20:02    TPro  6.4  /  Alb  3.7  /  TBili  0.2  /  DBili  x   /  AST  23  /  ALT  12  /  AlkPhos  71  11-04      Culture - Urine (collected 03 Nov 2017 03:02)  Source: .Urine Catheterized  Final Report (04 Nov 2017 08:17):    <10,000 CFU/ml Normal Urogenital pina present      RADIOLOGY and IMAGING reviewed: yes  Consultant notes reviewed : yes  Care discussed with Consultants/other providers : Patient is a 89y old  Male who presents with a chief complaint of syncope (04 Nov 2017 22:54)  pt seen and examined at bedside   SUBJECTIVE/OVERNIGHT events none, feels dragging sensation at deshpande site  Vital Signs Last 24 Hrs  T(C): 36.8 (05 Nov 2017 11:18), Max: 36.9 (05 Nov 2017 00:26)  T(F): 98.2 (05 Nov 2017 11:18), Max: 98.4 (05 Nov 2017 00:26)  HR: 58 (05 Nov 2017 11:18) (54 - 73)  BP: 116/61 (05 Nov 2017 11:18) (102/54 - 136/50)  BP(mean): --  RR: 18 (05 Nov 2017 11:18) (16 - 19)  SpO2: 98% (05 Nov 2017 11:18) (97% - 100%)  CAPILLARY BLOOD GLUCOSE        MEDICATIONS  (STANDING):  atorvastatin 10 milliGRAM(s) Oral at bedtime  cefTRIAXone   IVPB 1 Gram(s) IV Intermittent every 24 hours  heparin  Injectable 5000 Unit(s) SubCutaneous every 12 hours  influenza   Vaccine 0.5 milliLiter(s) IntraMuscular once  latanoprost 0.005% Ophthalmic Solution 1 Drop(s) Both EYES at bedtime  levothyroxine 100 MICROGram(s) Oral daily  sodium chloride 0.9%. 1000 milliLiter(s) (125 mL/Hr) IV Continuous <Continuous>  tamsulosin 0.4 milliGRAM(s) Oral at bedtime    MEDICATIONS  (PRN):    PHYSICAL EXAM  General : comfortable, not in any acute distress  Neck : supple, no LAD, no JVD  Eyes : EOMI, PERRLA, conjunctiva : no icterus, no pallor  MM moist, no pharyngeal erythema/exudates  Pulmonary: clear to auscultation bilateral lung fields, no crackles/rhonchi/wheezing,   CVS : S1 S2,rate normal-,rhythm-regular, no audible murmurs, no abnormal sounds  Gastrointestinal: soft, non tender, non distended, no organomegaly , bowel sounds audible  Extremities: no edema, deshpande clear urine  Neuro: AAOx3, no focal deficits  Musculoskeletal: no obvious limitations, FROM of all ext  Skin: no rash  LABS                        8.5    12.16 )-----------( 314      ( 05 Nov 2017 08:21 )             26.3     11-04    140  |  103  |  40<H>  ----------------------------<  124<H>  4.2   |  21<L>  |  2.43<H>    Ca    8.5      04 Nov 2017 20:02    TPro  6.4  /  Alb  3.7  /  TBili  0.2  /  DBili  x   /  AST  23  /  ALT  12  /  AlkPhos  71  11-04      Culture - Urine (collected 03 Nov 2017 03:02)  Source: .Urine Catheterized  Final Report (04 Nov 2017 08:17):    <10,000 CFU/ml Normal Urogenital pina present      RADIOLOGY and IMAGING reviewed: yes  Consultant notes reviewed : yes  Care discussed with Consultants/other providers :

## 2017-11-06 LAB
ANION GAP SERPL CALC-SCNC: 12 MMOL/L — SIGNIFICANT CHANGE UP (ref 5–17)
APPEARANCE UR: ABNORMAL
BILIRUB UR-MCNC: NEGATIVE — SIGNIFICANT CHANGE UP
BUN SERPL-MCNC: 25 MG/DL — HIGH (ref 7–23)
CALCIUM SERPL-MCNC: 8 MG/DL — LOW (ref 8.4–10.5)
CHLORIDE SERPL-SCNC: 107 MMOL/L — SIGNIFICANT CHANGE UP (ref 96–108)
CO2 SERPL-SCNC: 19 MMOL/L — LOW (ref 22–31)
COLOR SPEC: YELLOW — SIGNIFICANT CHANGE UP
COMMENT - URINE: SIGNIFICANT CHANGE UP
CREAT SERPL-MCNC: 1.11 MG/DL — SIGNIFICANT CHANGE UP (ref 0.5–1.3)
DIFF PNL FLD: ABNORMAL
EPI CELLS # UR: SIGNIFICANT CHANGE UP /HPF
GLUCOSE SERPL-MCNC: 90 MG/DL — SIGNIFICANT CHANGE UP (ref 70–99)
GLUCOSE UR QL: NEGATIVE — SIGNIFICANT CHANGE UP
HCT VFR BLD CALC: 24 % — LOW (ref 39–50)
HGB BLD-MCNC: 8 G/DL — LOW (ref 13–17)
KETONES UR-MCNC: NEGATIVE — SIGNIFICANT CHANGE UP
LEUKOCYTE ESTERASE UR-ACNC: ABNORMAL
MCHC RBC-ENTMCNC: 32 PG — SIGNIFICANT CHANGE UP (ref 27–34)
MCHC RBC-ENTMCNC: 33.3 GM/DL — SIGNIFICANT CHANGE UP (ref 32–36)
MCV RBC AUTO: 96 FL — SIGNIFICANT CHANGE UP (ref 80–100)
NITRITE UR-MCNC: NEGATIVE — SIGNIFICANT CHANGE UP
PH UR: 6 — SIGNIFICANT CHANGE UP (ref 5–8)
PLATELET # BLD AUTO: 291 K/UL — SIGNIFICANT CHANGE UP (ref 150–400)
POTASSIUM SERPL-MCNC: 4.1 MMOL/L — SIGNIFICANT CHANGE UP (ref 3.5–5.3)
POTASSIUM SERPL-SCNC: 4.1 MMOL/L — SIGNIFICANT CHANGE UP (ref 3.5–5.3)
PROT UR-MCNC: 100 MG/DL
RBC # BLD: 2.5 M/UL — LOW (ref 4.2–5.8)
RBC # FLD: 14.6 % — HIGH (ref 10.3–14.5)
RBC CASTS # UR COMP ASSIST: ABNORMAL /HPF (ref 0–2)
SODIUM SERPL-SCNC: 138 MMOL/L — SIGNIFICANT CHANGE UP (ref 135–145)
SP GR SPEC: 1.02 — SIGNIFICANT CHANGE UP (ref 1.01–1.02)
UROBILINOGEN FLD QL: NEGATIVE — SIGNIFICANT CHANGE UP
WBC # BLD: 13.63 K/UL — HIGH (ref 3.8–10.5)
WBC # FLD AUTO: 13.63 K/UL — HIGH (ref 3.8–10.5)
WBC UR QL: >50 /HPF (ref 0–5)

## 2017-11-06 PROCEDURE — 76775 US EXAM ABDO BACK WALL LIM: CPT | Mod: 26

## 2017-11-06 RX ORDER — SODIUM CHLORIDE 9 MG/ML
1000 INJECTION INTRAMUSCULAR; INTRAVENOUS; SUBCUTANEOUS
Qty: 0 | Refills: 0 | Status: DISCONTINUED | OUTPATIENT
Start: 2017-11-06 | End: 2017-11-07

## 2017-11-06 RX ADMIN — CEFTRIAXONE 100 GRAM(S): 500 INJECTION, POWDER, FOR SOLUTION INTRAMUSCULAR; INTRAVENOUS at 21:45

## 2017-11-06 RX ADMIN — ATORVASTATIN CALCIUM 10 MILLIGRAM(S): 80 TABLET, FILM COATED ORAL at 21:45

## 2017-11-06 RX ADMIN — Medication 100 MICROGRAM(S): at 05:00

## 2017-11-06 RX ADMIN — HEPARIN SODIUM 5000 UNIT(S): 5000 INJECTION INTRAVENOUS; SUBCUTANEOUS at 18:34

## 2017-11-06 RX ADMIN — HEPARIN SODIUM 5000 UNIT(S): 5000 INJECTION INTRAVENOUS; SUBCUTANEOUS at 05:00

## 2017-11-06 RX ADMIN — LATANOPROST 1 DROP(S): 0.05 SOLUTION/ DROPS OPHTHALMIC; TOPICAL at 21:45

## 2017-11-06 NOTE — PROGRESS NOTE ADULT - SUBJECTIVE AND OBJECTIVE BOX
No pain, no shortness of breath      VITAL:  T(C): , Max: 36.8 (11-06-17 @ 11:19)  T(F): , Max: 98.2 (11-06-17 @ 11:19)  HR: 60 (11-06-17 @ 11:19)  BP: 100/64 (11-06-17 @ 11:19)  BP(mean): --  RR: 18 (11-06-17 @ 11:19)  SpO2: 92% (11-06-17 @ 11:19)      PHYSICAL EXAM:  Constitutional: NAD, Alert, pleasant, cachectic  HEENT: NCAT, DMM  Neck: Supple, No JVD  Respiratory: CTA-b/l  Cardiovascular: RRR s1s2, no m/r/g  Gastrointestinal: BS+, soft, NT/ND  Extremities: No peripheral edema b/l  Neurological: no focal deficits; strength grossly intact  : (+)deshpande -small amount of yellowish urine in bag  Back: no CVAT b/l  Skin: No rashes, no nevi; (+)pale      LABS:                        8.0    13.63 )-----------( 291      ( 06 Nov 2017 07:18 )             24.0     Na(138)/K(4.1)/Cl(107)/HCO3(19)/BUN(25)/Cr(1.11)Glu(90)/Ca(8.0)/Mg(--)/PO4(--)    11-06 @ 07:09  Na(139)/K(4.1)/Cl(106)/HCO3(15)/BUN(36)/Cr(1.83)Glu(82)/Ca(8.3)/Mg(--)/PO4(--)    11-05 @ 08:17  Na(140)/K(4.2)/Cl(103)/HCO3(21)/BUN(40)/Cr(2.43)Glu(124)/Ca(8.5)/Mg(--)/PO4(--)    11-04 @ 20:02    urine protein/creatinine 1.6g/g      IMPRESSION: 89M w/ HTN, hypothyroidism, and prostate CA, 11/4/17 admitted s/p episode of LOC, and with PRANAV    (1)PRANAV - prerenally mediated in association with UTI. Now resolving/resolved.    (2)Proteinuria - unclear exact etiology - paraneoplastic? Nonnephrotic range. Not indicated for renal biopsy.    (3)CV - hypovolemic on presentation - resolving. NS reduced today to 75cc/h, which seems appropriate    (3)ID - UTI - on Rocephin. Clinically improving    (4)Syncope - likely due to hypovolemia/hypotension in association with sepsis      RECOMMEND:  (1)IVF as ordered for now  (2)Follow up renal ultrasound  (3)Abx for GFR 45-55ml/min (present dosing is acceptable)        Henrique Jackson MD  [facility]  [phone] Seen at US. Feels much better today. Good appetite.       VITAL:  T(C): , Max: 36.8 (11-06-17 @ 11:19)  T(F): , Max: 98.2 (11-06-17 @ 11:19)  HR: 60 (11-06-17 @ 11:19)  BP: 100/64 (11-06-17 @ 11:19)  BP(mean): --  RR: 18 (11-06-17 @ 11:19)  SpO2: 92% (11-06-17 @ 11:19)      PHYSICAL EXAM:  Constitutional: NAD, Alert, pleasant, cachectic  HEENT: NCAT, DMM  Neck: Supple, No JVD  Respiratory: CTA-b/l  Cardiovascular: RRR s1s2, no m/r/g  Gastrointestinal: BS+, soft, NT/ND  Extremities: No peripheral edema b/l  Neurological: no focal deficits; strength grossly intact  : (+)deshpande  Back: no CVAT b/l  Skin: No rashes, no nevi; (+)pale      LABS:                        8.0    13.63 )-----------( 291      ( 06 Nov 2017 07:18 )             24.0     Na(138)/K(4.1)/Cl(107)/HCO3(19)/BUN(25)/Cr(1.11)Glu(90)/Ca(8.0)/Mg(--)/PO4(--)    11-06 @ 07:09  Na(139)/K(4.1)/Cl(106)/HCO3(15)/BUN(36)/Cr(1.83)Glu(82)/Ca(8.3)/Mg(--)/PO4(--)    11-05 @ 08:17  Na(140)/K(4.2)/Cl(103)/HCO3(21)/BUN(40)/Cr(2.43)Glu(124)/Ca(8.5)/Mg(--)/PO4(--)    11-04 @ 20:02    urine protein/creatinine 1.6g/g      IMPRESSION: 89M w/ HTN, hypothyroidism, and prostate CA, 11/4/17 admitted s/p episode of LOC, and with PRANAV    (1)PRANAV - prerenally mediated in association with UTI. Now resolving/resolved.    (2)Proteinuria - unclear exact etiology - paraneoplastic? Nonnephrotic range. Not indicated for renal biopsy.    (3)CV - hypovolemic on presentation - resolving. NS reduced today to 75cc/h, which seems appropriate    (3)ID - UTI - on Rocephin. Clinically improving    (4)Syncope - likely due to hypovolemia/hypotension in association with sepsis      RECOMMEND:  (1)IVF as ordered for now  (2)Follow up renal ultrasound  (3)Trial of void  (4)Abx for GFR 45-55ml/min (present dosing is acceptable)        Henrique Jackson MD  [facility]  [phone]

## 2017-11-06 NOTE — PROGRESS NOTE ADULT - SUBJECTIVE AND OBJECTIVE BOX
Patient is a 89y old  Male who presents with a chief complaint of syncope (04 Nov 2017 22:54)  pt seen and examined at bedside   SUBJECTIVE/OVERNIGHT events none, failed voiding trial last night  Vital Signs Last 24 Hrs  T(C): 36.8 (06 Nov 2017 11:19), Max: 36.8 (06 Nov 2017 11:19)  T(F): 98.2 (06 Nov 2017 11:19), Max: 98.2 (06 Nov 2017 11:19)  HR: 60 (06 Nov 2017 11:19) (56 - 60)  BP: 100/64 (06 Nov 2017 11:19) (100/64 - 120/72)  BP(mean): --  RR: 18 (06 Nov 2017 11:19) (18 - 18)  SpO2: 92% (06 Nov 2017 11:19) (92% - 98%)  CAPILLARY BLOOD GLUCOSE        MEDICATIONS  (STANDING):  atorvastatin 10 milliGRAM(s) Oral at bedtime  cefTRIAXone   IVPB 1 Gram(s) IV Intermittent every 24 hours  heparin  Injectable 5000 Unit(s) SubCutaneous every 12 hours  influenza   Vaccine 0.5 milliLiter(s) IntraMuscular once  latanoprost 0.005% Ophthalmic Solution 1 Drop(s) Both EYES at bedtime  levothyroxine 100 MICROGram(s) Oral daily  sodium chloride 0.9%. 1000 milliLiter(s) (75 mL/Hr) IV Continuous <Continuous>  tamsulosin 0.4 milliGRAM(s) Oral at bedtime    MEDICATIONS  (PRN):    PHYSICAL EXAM  General : comfortable, not in any acute distress  Neck : supple, no LAD, no JVD  Eyes : EOMI, PERRLA, conjunctiva : no icterus, no pallor  MM moist, no pharyngeal erythema/exudates  Pulmonary: clear to auscultation bilateral lung fields, no crackles/rhonchi/wheezing,   CVS : S1 S2,rate normal-,rhythm-regular, no audible murmurs, no abnormal sounds  Gastrointestinal: soft, non tender, non distended, no organomegaly , bowel sounds audible  Extremities: no edema  Neuro: AAOx2-3, no focal deficits  Musculoskeletal: no obvious limitations, FROM of all ext  Skin: no rash  LABS                        8.0    13.63 )-----------( 291      ( 06 Nov 2017 07:18 )             24.0     11-06    138  |  107  |  25<H>  ----------------------------<  90  4.1   |  19<L>  |  1.11    Ca    8.0<L>      06 Nov 2017 07:09    TPro  6.4  /  Alb  3.7  /  TBili  0.2  /  DBili  x   /  AST  23  /  ALT  12  /  AlkPhos  71  11-04      Culture - Blood (collected 05 Nov 2017 08:17)  Source: .Blood Blood-Peripheral  Preliminary Report (06 Nov 2017 09:01):    No growth to date.      RADIOLOGY and IMAGING reviewed: yes  Consultant notes reviewed : yes  Care discussed with Consultants/other providers :

## 2017-11-07 DIAGNOSIS — D72.829 ELEVATED WHITE BLOOD CELL COUNT, UNSPECIFIED: ICD-10-CM

## 2017-11-07 DIAGNOSIS — R33.9 RETENTION OF URINE, UNSPECIFIED: ICD-10-CM

## 2017-11-07 DIAGNOSIS — D64.9 ANEMIA, UNSPECIFIED: ICD-10-CM

## 2017-11-07 LAB
ANION GAP SERPL CALC-SCNC: 10 MMOL/L — SIGNIFICANT CHANGE UP (ref 5–17)
BUN SERPL-MCNC: 19 MG/DL — SIGNIFICANT CHANGE UP (ref 7–23)
CALCIUM SERPL-MCNC: 7.8 MG/DL — LOW (ref 8.4–10.5)
CHLORIDE SERPL-SCNC: 108 MMOL/L — SIGNIFICANT CHANGE UP (ref 96–108)
CO2 SERPL-SCNC: 22 MMOL/L — SIGNIFICANT CHANGE UP (ref 22–31)
CREAT SERPL-MCNC: 1.04 MG/DL — SIGNIFICANT CHANGE UP (ref 0.5–1.3)
CULTURE RESULTS: NO GROWTH — SIGNIFICANT CHANGE UP
GLUCOSE SERPL-MCNC: 125 MG/DL — HIGH (ref 70–99)
HCT VFR BLD CALC: 24.5 % — LOW (ref 39–50)
HGB BLD-MCNC: 8.4 G/DL — LOW (ref 13–17)
MAGNESIUM SERPL-MCNC: 1.6 MG/DL — SIGNIFICANT CHANGE UP (ref 1.6–2.6)
MCHC RBC-ENTMCNC: 34.1 GM/DL — SIGNIFICANT CHANGE UP (ref 32–36)
MCHC RBC-ENTMCNC: 34.3 PG — HIGH (ref 27–34)
MCV RBC AUTO: 100 FL — SIGNIFICANT CHANGE UP (ref 80–100)
PLATELET # BLD AUTO: 294 K/UL — SIGNIFICANT CHANGE UP (ref 150–400)
POTASSIUM SERPL-MCNC: 3.8 MMOL/L — SIGNIFICANT CHANGE UP (ref 3.5–5.3)
POTASSIUM SERPL-SCNC: 3.8 MMOL/L — SIGNIFICANT CHANGE UP (ref 3.5–5.3)
RBC # BLD: 2.44 M/UL — LOW (ref 4.2–5.8)
RBC # FLD: 12.5 % — SIGNIFICANT CHANGE UP (ref 10.3–14.5)
SODIUM SERPL-SCNC: 140 MMOL/L — SIGNIFICANT CHANGE UP (ref 135–145)
SPECIMEN SOURCE: SIGNIFICANT CHANGE UP
WBC # BLD: 11.4 K/UL — HIGH (ref 3.8–10.5)
WBC # FLD AUTO: 11.4 K/UL — HIGH (ref 3.8–10.5)

## 2017-11-07 RX ADMIN — LATANOPROST 1 DROP(S): 0.05 SOLUTION/ DROPS OPHTHALMIC; TOPICAL at 21:33

## 2017-11-07 RX ADMIN — Medication 100 MICROGRAM(S): at 06:03

## 2017-11-07 RX ADMIN — HEPARIN SODIUM 5000 UNIT(S): 5000 INJECTION INTRAVENOUS; SUBCUTANEOUS at 06:03

## 2017-11-07 RX ADMIN — TAMSULOSIN HYDROCHLORIDE 0.4 MILLIGRAM(S): 0.4 CAPSULE ORAL at 21:32

## 2017-11-07 RX ADMIN — HEPARIN SODIUM 5000 UNIT(S): 5000 INJECTION INTRAVENOUS; SUBCUTANEOUS at 18:13

## 2017-11-07 RX ADMIN — ATORVASTATIN CALCIUM 10 MILLIGRAM(S): 80 TABLET, FILM COATED ORAL at 21:32

## 2017-11-07 NOTE — CONSULT NOTE ADULT - PROBLEM SELECTOR RECOMMENDATION 9
It is clearly possible that patient recently did have an actual urinary infection but does not at this point appear to have an active urinary infection. Recommend following off abx.

## 2017-11-07 NOTE — CONSULT NOTE ADULT - PROBLEM SELECTOR RECOMMENDATION 2
Noted that there is a significant lymphocyte percentage that would not be suggestive of response to acute infection. May require hematology evaluation

## 2017-11-07 NOTE — PROGRESS NOTE ADULT - PROBLEM SELECTOR PLAN 9
could be partly dilutional, no e/o active bleeding, will dc ivf and monitor  d/w pt's pcp-pt sees hematologist outpt for anemia w/u  iron studies, b12, folate, ldh

## 2017-11-07 NOTE — CONSULT NOTE ADULT - PROBLEM SELECTOR RECOMMENDATION 3
By history there appears to be retention playing some part in this process. Patient currently with deshpande.

## 2017-11-07 NOTE — PHYSICAL THERAPY INITIAL EVALUATION ADULT - PERTINENT HX OF CURRENT PROBLEM, REHAB EVAL
This is an 89 year old gentleman with history of HTN/hypothyroidism/prostate CA BIBA for syncopal episode. Patient is a poor historian, and provider unable to gain collateral from daughter. Per patient and chart biopsy, he was in USOH  today.  Patient was sitting eating dinner when he slumped backwards in his chair and appeared to shake for several seconds, unresponsive to external stimuli during this time. Came to after ~1 minute, no post-ictal period.

## 2017-11-07 NOTE — CONSULT NOTE ADULT - PROBLEM SELECTOR RECOMMENDATION 4
No obviously related to acute infectious process.    Thank you for consulting us and involving us in the management of this most interesting and challenging case.     Please Call with any further questions

## 2017-11-07 NOTE — PROGRESS NOTE ADULT - SUBJECTIVE AND OBJECTIVE BOX
Patient is a 89y old  Male who presents with a chief complaint of syncope (04 Nov 2017 22:54)  pt seen and examined at bedside   SUBJECTIVE/OVERNIGHT no events, oob to chair, good appetite  Vital Signs Last 24 Hrs  T(C): 36.9 (07 Nov 2017 14:12), Max: 36.9 (06 Nov 2017 20:45)  T(F): 98.4 (07 Nov 2017 14:12), Max: 98.5 (06 Nov 2017 20:45)  HR: 64 (07 Nov 2017 14:12) (57 - 64)  BP: 119/77 (07 Nov 2017 14:12) (109/46 - 125/58)  BP(mean): --  RR: 18 (07 Nov 2017 14:12) (18 - 18)  SpO2: 97% (07 Nov 2017 14:12) (97% - 97%)  CAPILLARY BLOOD GLUCOSE        MEDICATIONS  (STANDING):  atorvastatin 10 milliGRAM(s) Oral at bedtime  heparin  Injectable 5000 Unit(s) SubCutaneous every 12 hours  influenza   Vaccine 0.5 milliLiter(s) IntraMuscular once  latanoprost 0.005% Ophthalmic Solution 1 Drop(s) Both EYES at bedtime  levothyroxine 100 MICROGram(s) Oral daily  sodium chloride 0.9%. 1000 milliLiter(s) (75 mL/Hr) IV Continuous <Continuous>  tamsulosin 0.4 milliGRAM(s) Oral at bedtime    MEDICATIONS  (PRN):    PHYSICAL EXAM  General : comfortable, not in any acute distress, deshpande clear urine  Neck : supple, no LAD, no JVD  Eyes : EOMI, PERRLA, conjunctiva : no icterus, no pallor  MM moist, no pharyngeal erythema/exudates  Pulmonary: clear to auscultation bilateral lung fields, no crackles/rhonchi/wheezing,   CVS : S1 S2,rate normal-,rhythm-regular, no murmurs, no abnormal sounds  Gastrointestinal: soft, non tender, non distended, no organomegaly , bowel sounds audible  Extremities: no edema  Neuro: AAOx3, no focal deficits  Musculoskeletal:  FROM of all ext  Skin: no rash  LABS                        8.4    11.4  )-----------( 294      ( 07 Nov 2017 11:06 )             24.5     11-07    140  |  108  |  19  ----------------------------<  125<H>  3.8   |  22  |  1.04    Ca    7.8<L>      07 Nov 2017 11:06  Mg     1.6     11-07        Culture - Urine (collected 06 Nov 2017 13:14)  Source: .Urine Clean Catch (Midstream)  Final Report (07 Nov 2017 08:01):    No growth    Culture - Blood (collected 05 Nov 2017 08:17)  Source: .Blood Blood-Peripheral  Preliminary Report (06 Nov 2017 09:01):    No growth to date.      RADIOLOGY and IMAGING reviewed: yes  Consultant notes reviewed : yes  Care discussed with Consultants/other providers :

## 2017-11-07 NOTE — CONSULT NOTE ADULT - ASSESSMENT
88 yo male admitted for syncopal events with no significant urinary symptoms and negative urine cultures, no fever, but with leukocytosis, urinary retention and an abn UA

## 2017-11-07 NOTE — PHYSICAL THERAPY INITIAL EVALUATION ADULT - ADDITIONAL COMMENTS
Pt resides with spouse in private house +6 steps to enter+chair lift. Pt verbalized prior to hospitalization he was independent using straight cane for ambulation. required occasional assist with ADLs from daughter.

## 2017-11-07 NOTE — PROGRESS NOTE ADULT - SUBJECTIVE AND OBJECTIVE BOX
Nephrology Progress Note    No events overnight  Chart reviewed  Creatinine improved  Ani US unremarkable  Deshpande still in place    Patient denies any pain or sob, feel sbetter    VITAL:  T(C): , Max: 36.8 (11-06-17 @ 11:19)  T(F): , Max: 98.2 (11-06-17 @ 11:19)  HR: 60 (11-06-17 @ 11:19)  BP: 100/64 (11-06-17 @ 11:19)  BP(mean): --  RR: 18 (11-06-17 @ 11:19)  SpO2: 92% (11-06-17 @ 11:19)      PHYSICAL EXAM:  Constitutional: cachectic male, in no distress, awake and alert  HEENT: NCAT, MMM  Neck: Supple, No JVD  Respiratory: CTA-b/l, normal effort  Cardiovascular: RRR s1s2, no m/r/g  Gastrointestinal: BS+, soft, NT/ND  Extremities: No peripheral edema b/l  Neurological: no focal deficits; strength grossly intact  : (+)deshpande  Back: no CVAT b/l  Skin: No rashes, no nevi; (+)pale      LABS:                        8.0    13.63 )-----------( 291      ( 06 Nov 2017 07:18 )             24.0     Na(138)/K(4.1)/Cl(107)/HCO3(19)/BUN(25)/Cr(1.11)Glu(90)/Ca(8.0)/Mg(--)/PO4(--)    11-06 @ 07:09  Na(139)/K(4.1)/Cl(106)/HCO3(15)/BUN(36)/Cr(1.83)Glu(82)/Ca(8.3)/Mg(--)/PO4(--)    11-05 @ 08:17  Na(140)/K(4.2)/Cl(103)/HCO3(21)/BUN(40)/Cr(2.43)Glu(124)/Ca(8.5)/Mg(--)/PO4(--)    11-04 @ 20:02    urine protein/creatinine 1.6g/g    Imaging: Renal US: IMPRESSION:  No renal stones or hydronephrosis.  No perinephric collection.    IMPRESSION: 89M w/ HTN, hypothyroidism, and prostate CA, 11/4/17 admitted s/p episode of LOC, and with PRANAV    1. PRANAV - likely multifactorial- prerenal, some degree of obstructive nephropathy; post UTI - improving  2. Proteinuria - unclear exact etiology - paraneoplastic? Nonnephrotic range. Not indicated for renal biopsy as creatinine is improving.  3. Hypovolemic on presentation - resolving. NS reduced today to 75cc/h, which seems appropriate  4. UTI - ID on board  5. Syncope - likely due to hypovolemia/hypotension  6. Anemia- likely of chronic disease  7. Metabolic acidosis, bicarbonates 19 today- improving- likely due to PRANAV      RECOMMEND:  - continue IVF x 24 hrs considering soft BP  - voiding trials  - dose meds per GFR 45-55ml/min  - avoid nephrotoxins  - will check iron stores, phos and mag if still inpatient     Sonido Worley MD  [facility]  [phone]

## 2017-11-07 NOTE — PHYSICAL THERAPY INITIAL EVALUATION ADULT - GAIT DEVIATIONS NOTED, PT EVAL
decreased caron/decreased weight-shifting ability/150 ft x2 with RW, 100 ft using S.C/decreased step length

## 2017-11-07 NOTE — CONSULT NOTE ADULT - SUBJECTIVE AND OBJECTIVE BOX
HPI:  This is an 89 year old gentleman originally from [place] with history of HTN/hypothyroidism/prostate CA BIBA for syncopal episode. . Per patient and chart, he was in USOH  the day of admission.  Patient was sitting eating dinner when he slumped backwards in his chair and appeared to shake for several seconds, unresponsive to external stimuli during this time. Came to after ~1 minute, no post-ictal period. Patient stated that he felt light-headed beforehand, but otherwise denies flank pain, dysuria, chest pain, palpitations, fevers, chills, sore throat, runny nose, diarrhea, constipation. In recent history, patient was seen by PMD 4 days ago for urinary retention and frequency. He was diagnosed with UTI and started on cipro. He then presented to [facility] ED 2 days ago for persistent symptoms, antibiotics changed to keflex and flomax started, bladder scan negative x 2.  On my exam, patient without complaints.      In ambulance, patient was noted to be hypotensive.  On arrival to ED, Tmax 97.7, HR 63, /54 - 136/58, sating well on RA.  Labs notable for WBC 16.2, Hgb 9.2, Plt 354, Na 140, bicarb 21, Cr 2.43, , troponin 0.05, VBG 7.3/48, lactate 2.7. Received ceftriaxone, 3L NS. (2017 22:54)    This morning patient reports he is doing well and says it has been some time since he has had any urinary symptoms.      PAST MEDICAL & SURGICAL HISTORY:  Hyperlipidemia  Basal cell carcinoma of skin  Essential hypertension  Hypothyroid  No significant past surgical history      Antimicrobials  cefTRIAXone   IVPB 1 Gram(s) IV Intermittent every 24 hours      Immunological  influenza   Vaccine 0.5 milliLiter(s) IntraMuscular once      Other  atorvastatin 10 milliGRAM(s) Oral at bedtime  heparin  Injectable 5000 Unit(s) SubCutaneous every 12 hours  latanoprost 0.005% Ophthalmic Solution 1 Drop(s) Both EYES at bedtime  levothyroxine 100 MICROGram(s) Oral daily  sodium chloride 0.9%. 1000 milliLiter(s) IV Continuous <Continuous>  tamsulosin 0.4 milliGRAM(s) Oral at bedtime      Allergies    No Known Allergies    Intolerances        SOCIAL HISTORY: no current tobacco reported    FAMILY HISTORY:  Family history of essential hypertension      ROS:    EYES:  Negative  blurry vision or double vision  GASTROINTESTINAL:  Negative for nausea, vomiting, diarrhea  -otherwise negative except for subjective    Vital Signs Last 24 Hrs  T(C): 36.7 (2017 03:59), Max: 36.9 (2017 20:45)  T(F): 98.1 (2017 03:59), Max: 98.5 (2017 20:45)  HR: 57 (2017 03:59) (57 - 60)  BP: 125/58 (2017 03:59) (100/64 - 125/58)  BP(mean): --  RR: 18 (2017 03:59) (18 - 18)  SpO2: 97% (2017 03:59) (92% - 97%)    PE:  WDWN in no distress  HEENT:  NC, PERRL, sclerae anicteric, conjunctivae clear, EOMI.  Sinuses nontender, no nasal exudate.  No buccal or pharyngeal lesions, erythema or exudate  Neck:  Supple, no adenopathy  Lungs:  No accessory muscle use, bilaterally clear to auscultation  Cor:  RRR, S1, S2, no murmur appreciated  Abd:  Symmetric, normoactive BS.  Soft, nontender, no masses, guarding or rebound.  Liver and spleen not enlarged  Extrem:  No cyanosis or edema  Skin:  No rashes.  Neuro: grossly intact  Musc: moving all limbs freely, no focal deficits    LABS:                        8.0    13.63 )-----------( 291      ( 2017 07:18 )             24.0     11-    138  |  107  |  25<H>  ----------------------------<  90  4.1   |  19<L>  |  1.11    Ca    8.0<L>      2017 07:09      Urinalysis Basic - ( 2017 10:52 )    Color: Yellow / Appearance: SL Turbid / S.016 / pH: x  Gluc: x / Ketone: Negative  / Bili: Negative / Urobili: Negative   Blood: x / Protein: 100 mg/dL / Nitrite: Negative   Leuk Esterase: Large / RBC: 25-50 /HPF / WBC >50 /HPF   Sq Epi: x / Non Sq Epi: OCC /HPF / Bacteria: x        MICROBIOLOGY:    Culture - Urine (17 @ 13:14)    Specimen Source: .Urine Clean Catch (Midstream)    Culture Results:   No growth    Culture - Urine (17 @ 03:02)    Specimen Source: .Urine Catheterized    Culture Results:   <10,000 CFU/ml Normal Urogenital pina present    RADIOLOGY & ADDITIONAL STUDIES:    --< from: Xray Chest 2 Views PA/Lat (10.09.15 @ 15:46) >  EXAM:  CHEST PA & LAT                            PROCEDURE DATE:  Oct  9 2015       INTERPRETATION:  DATE OF STUDY: 10/9/15.    COMPARISON: 10/8/15.    CLINICAL INDICATION: 87-yo-male with rash; cellulitis.    TECHNIQUE: PA and lateral chest films.    FINDINGS:  The heart is normal in size.  There is no pulmonary consolidation.   There is new, trace left pleural effusion.  The visualized osseous and soft tissues are unremarkable.    IMPRESSION: Since 10/8/15 study, new trace left pleural effusion has   developed. No clear-cut bilateral consolidations.

## 2017-11-07 NOTE — PROVIDER CONTACT NOTE (OTHER) - ASSESSMENT
patient refused Flomax at night, patient stated that Flomax reduces BP and doctor discontinued Flomax

## 2017-11-08 LAB
ANION GAP SERPL CALC-SCNC: 11 MMOL/L — SIGNIFICANT CHANGE UP (ref 5–17)
BUN SERPL-MCNC: 20 MG/DL — SIGNIFICANT CHANGE UP (ref 7–23)
CALCIUM SERPL-MCNC: 8.2 MG/DL — LOW (ref 8.4–10.5)
CHLORIDE SERPL-SCNC: 106 MMOL/L — SIGNIFICANT CHANGE UP (ref 96–108)
CO2 SERPL-SCNC: 20 MMOL/L — LOW (ref 22–31)
CREAT SERPL-MCNC: 1.31 MG/DL — HIGH (ref 0.5–1.3)
FERRITIN SERPL-MCNC: 124 NG/ML — SIGNIFICANT CHANGE UP (ref 30–400)
FOLATE SERPL-MCNC: 7.9 NG/ML — SIGNIFICANT CHANGE UP (ref 4.8–24.2)
GLUCOSE SERPL-MCNC: 91 MG/DL — SIGNIFICANT CHANGE UP (ref 70–99)
HCT VFR BLD CALC: 25.9 % — LOW (ref 39–50)
HGB BLD-MCNC: 8.7 G/DL — LOW (ref 13–17)
IRON SATN MFR SERPL: 28 % — SIGNIFICANT CHANGE UP (ref 16–55)
IRON SATN MFR SERPL: 29 % — SIGNIFICANT CHANGE UP (ref 16–55)
IRON SATN MFR SERPL: 56 UG/DL — SIGNIFICANT CHANGE UP (ref 45–165)
IRON SATN MFR SERPL: 57 UG/DL — SIGNIFICANT CHANGE UP (ref 45–165)
LDH SERPL L TO P-CCNC: 324 U/L — HIGH (ref 50–242)
MCHC RBC-ENTMCNC: 32.3 PG — SIGNIFICANT CHANGE UP (ref 27–34)
MCHC RBC-ENTMCNC: 33.6 GM/DL — SIGNIFICANT CHANGE UP (ref 32–36)
MCV RBC AUTO: 96.3 FL — SIGNIFICANT CHANGE UP (ref 80–100)
PHOSPHATE SERPL-MCNC: 2.4 MG/DL — LOW (ref 2.5–4.5)
PLATELET # BLD AUTO: 305 K/UL — SIGNIFICANT CHANGE UP (ref 150–400)
POTASSIUM SERPL-MCNC: 4.2 MMOL/L — SIGNIFICANT CHANGE UP (ref 3.5–5.3)
POTASSIUM SERPL-SCNC: 4.2 MMOL/L — SIGNIFICANT CHANGE UP (ref 3.5–5.3)
RBC # BLD: 2.69 M/UL — LOW (ref 4.2–5.8)
RBC # FLD: 14.6 % — HIGH (ref 10.3–14.5)
SODIUM SERPL-SCNC: 137 MMOL/L — SIGNIFICANT CHANGE UP (ref 135–145)
TIBC SERPL-MCNC: 194 UG/DL — LOW (ref 220–430)
TIBC SERPL-MCNC: 206 UG/DL — LOW (ref 220–430)
TRANSFERRIN SERPL-MCNC: 142 MG/DL — LOW (ref 200–360)
UIBC SERPL-MCNC: 138 UG/DL — SIGNIFICANT CHANGE UP (ref 110–370)
UIBC SERPL-MCNC: 149 UG/DL — SIGNIFICANT CHANGE UP (ref 110–370)
VIT B12 SERPL-MCNC: 445 PG/ML — SIGNIFICANT CHANGE UP (ref 243–894)
WBC # BLD: 12.78 K/UL — HIGH (ref 3.8–10.5)
WBC # FLD AUTO: 12.78 K/UL — HIGH (ref 3.8–10.5)

## 2017-11-08 RX ADMIN — ATORVASTATIN CALCIUM 10 MILLIGRAM(S): 80 TABLET, FILM COATED ORAL at 21:29

## 2017-11-08 RX ADMIN — HEPARIN SODIUM 5000 UNIT(S): 5000 INJECTION INTRAVENOUS; SUBCUTANEOUS at 05:15

## 2017-11-08 RX ADMIN — TAMSULOSIN HYDROCHLORIDE 0.4 MILLIGRAM(S): 0.4 CAPSULE ORAL at 21:29

## 2017-11-08 RX ADMIN — Medication 100 MICROGRAM(S): at 05:15

## 2017-11-08 RX ADMIN — HEPARIN SODIUM 5000 UNIT(S): 5000 INJECTION INTRAVENOUS; SUBCUTANEOUS at 17:39

## 2017-11-08 RX ADMIN — LATANOPROST 1 DROP(S): 0.05 SOLUTION/ DROPS OPHTHALMIC; TOPICAL at 21:29

## 2017-11-08 NOTE — PROGRESS NOTE ADULT - SUBJECTIVE AND OBJECTIVE BOX
Nephrology Progress Note    No events overnight  patient looks comfortable and not in distress  Denies any pain or sob  Eating and enjoying it  Daughter at bedside      VITAL:  Afebrile, /52, saturating well at 95%    PHYSICAL EXAM:  Constitutional: cachectic male, in no distress, awake and alert  HEENT: NCAT, MMM  Neck: Supple, No JVD  Respiratory: CTA-b/l, normal effort  Cardiovascular: RRR s1s2, no m/r/g  Gastrointestinal: BS+, soft, NT/ND  Extremities: No peripheral edema b/l  Neurological: no focal deficits; strength grossly intact  : (+)deshpande  Back: no CVAT b/l  Skin: No rashes, no nevi; (+)pale      LABS:             Noted leukocytosis  Anemia with Hgb 8.7  Iron stores, B12 and folate acceptable  Cr up to 1.31 today    Na(138)/K(4.1)/Cl(107)/HCO3(19)/BUN(25)/Cr(1.11)Glu(90)/Ca(8.0)/Mg(--)/PO4(--)    11-06 @ 07:09  Na(139)/K(4.1)/Cl(106)/HCO3(15)/BUN(36)/Cr(1.83)Glu(82)/Ca(8.3)/Mg(--)/PO4(--)    11-05 @ 08:17  Na(140)/K(4.2)/Cl(103)/HCO3(21)/BUN(40)/Cr(2.43)Glu(124)/Ca(8.5)/Mg(--)/PO4(--)    11-04 @ 20:02    urine protein/creatinine 1.6g/g    Imaging: Renal US: IMPRESSION:  No renal stones or hydronephrosis.  No perinephric collection.    IMPRESSION: 89M w/ HTN, hypothyroidism, and prostate CA, 11/4/17 admitted s/p episode of LOC, and with PRANAV    1. PRANAV - likely multifactorial- prerenal, some degree of obstructive nephropathy; post UTI - creatinine increased today. ? microvascular disease which could be sensitive to volume changes.   2. Proteinuria - unclear exact etiology - paraneoplastic? Nonnephrotic range. Not indicated for renal biopsy as creatinine is improving.  3. Hypovolemic on presentation - resolving. NS reduced today to 75cc/h, which seems appropriate  4. UTI - ID on board  5. Syncope - likely due to hypovolemia/hypotension  6. Anemia- likely of chronic disease  7. Metabolic acidosis, bicarbonates 20 today- improving- likely due to PRANAV      RECOMMEND:  - IVF and deshpande discontinued  - continue to monitor kidney function while inpatient  - dose meds per GFR 45-55ml/min  - avoid nephrotoxins  - monitor and support hemodynamics- keep MAP>70  - will need hematology workup for anemia - workup unrevealing here    Sonido Worley MD  [facility]  [phone]

## 2017-11-08 NOTE — PROGRESS NOTE ADULT - PROBLEM SELECTOR PLAN 9
could be partly dilutional, no e/o active bleeding  d/w pt's pcp-pt sees hematologist outpt for anemia w/u. Questionable myelodysplasia.  iron studies, b12, folate, ldh

## 2017-11-08 NOTE — PROGRESS NOTE ADULT - SUBJECTIVE AND OBJECTIVE BOX
Patient is a 89y old  Male who presents with a chief complaint of syncope (04 Nov 2017 22:54)      SUBJECTIVE / OVERNIGHT EVENTS:  Pt seen and examined at bedside. Feels well.   No chest pain, no shortness of breath.   No overnight event.   No N/V/D. No abdominal pain. +BM  The daughter at bedside.         Vital Signs Last 24 Hrs  T(C): 36.7 (08 Nov 2017 11:11), Max: 36.9 (07 Nov 2017 20:35)  T(F): 98 (08 Nov 2017 11:11), Max: 98.4 (07 Nov 2017 20:35)  HR: 66 (08 Nov 2017 11:11) (61 - 66)  BP: 118/52 (08 Nov 2017 11:11) (118/52 - 131/55)  BP(mean): --  RR: 17 (08 Nov 2017 11:11) (17 - 18)  SpO2: 95% (08 Nov 2017 11:11) (95% - 97%)  I&O's Summary    07 Nov 2017 07:01  -  08 Nov 2017 07:00  --------------------------------------------------------  IN: 1940 mL / OUT: 1320 mL / NET: 620 mL    08 Nov 2017 07:01  -  08 Nov 2017 19:37  --------------------------------------------------------  IN: 1100 mL / OUT: 25 mL / NET: 1075 mL        PHYSICAL EXAM:  GENERAL: NAD, Comfortable  HEAD:  Atraumatic, Normocephalic  EYES: EOMI, PERRLA, conjunctiva and sclera clear  NECK: Supple, No JVD  CHEST/LUNG: Clear to auscultation bilaterally; No wheeze  HEART: Regular rate and rhythm; No murmurs, rubs, or gallops  ABDOMEN: Soft, Nontender, Nondistended; Bowel sounds present  Neuro: AAO x 3, no focal deficit, 5/5 b/l extremities  EXTREMITIES:  2+ Peripheral Pulses, No clubbing, cyanosis, or edema  SKIN: No rashes or lesions    LABS:                        8.7    12.78 )-----------( 305      ( 08 Nov 2017 07:40 )             25.9     11-08    137  |  106  |  20  ----------------------------<  91  4.2   |  20<L>  |  1.31<H>    Ca    8.2<L>      08 Nov 2017 07:40  Phos  2.4     11-08  Mg     1.6     11-07        CAPILLARY BLOOD GLUCOSE                RADIOLOGY & ADDITIONAL TESTS:    Imaging Personally Reviewed:  [x] YES  [ ] NO    Consultant(s) Notes Reviewed:  [x] YES  [ ] NO      MEDICATIONS  (STANDING):  atorvastatin 10 milliGRAM(s) Oral at bedtime  heparin  Injectable 5000 Unit(s) SubCutaneous every 12 hours  influenza   Vaccine 0.5 milliLiter(s) IntraMuscular once  latanoprost 0.005% Ophthalmic Solution 1 Drop(s) Both EYES at bedtime  levothyroxine 100 MICROGram(s) Oral daily  tamsulosin 0.4 milliGRAM(s) Oral at bedtime    MEDICATIONS  (PRN):      Care Discussed with Consultants/Other Providers [x] YES  [ ] NO    HEALTH ISSUES - PROBLEM Dx:  Urinary retention: Urinary retention  Anemia, unspecified type: Anemia, unspecified type  Leukocytosis, unspecified type: Leukocytosis, unspecified type  Nutrition, metabolism, and development symptoms: Nutrition, metabolism, and development symptoms  Prophylactic measure: Prophylactic measure  Hypothyroid: Hypothyroid  Hyperlipidemia: Hyperlipidemia  Acute kidney injury: Acute kidney injury  Hypotension: Hypotension  Complicated urinary tract infection: Complicated urinary tract infection  Syncope, unspecified syncope type: Syncope, unspecified syncope type

## 2017-11-09 ENCOUNTER — TRANSCRIPTION ENCOUNTER (OUTPATIENT)
Age: 82
End: 2017-11-09

## 2017-11-09 VITALS
RESPIRATION RATE: 17 BRPM | OXYGEN SATURATION: 99 % | DIASTOLIC BLOOD PRESSURE: 63 MMHG | HEART RATE: 63 BPM | SYSTOLIC BLOOD PRESSURE: 115 MMHG | TEMPERATURE: 98 F

## 2017-11-09 LAB
ALBUMIN SERPL ELPH-MCNC: 2.7 G/DL — LOW (ref 3.3–5)
ALP SERPL-CCNC: 67 U/L — SIGNIFICANT CHANGE UP (ref 40–120)
ALT FLD-CCNC: 8 U/L — LOW (ref 10–45)
ANION GAP SERPL CALC-SCNC: 13 MMOL/L — SIGNIFICANT CHANGE UP (ref 5–17)
AST SERPL-CCNC: 35 U/L — SIGNIFICANT CHANGE UP (ref 10–40)
BILIRUB SERPL-MCNC: 0.2 MG/DL — SIGNIFICANT CHANGE UP (ref 0.2–1.2)
BUN SERPL-MCNC: 20 MG/DL — SIGNIFICANT CHANGE UP (ref 7–23)
CALCIUM SERPL-MCNC: 8.3 MG/DL — LOW (ref 8.4–10.5)
CHLORIDE SERPL-SCNC: 108 MMOL/L — SIGNIFICANT CHANGE UP (ref 96–108)
CO2 SERPL-SCNC: 21 MMOL/L — LOW (ref 22–31)
CREAT SERPL-MCNC: 0.98 MG/DL — SIGNIFICANT CHANGE UP (ref 0.5–1.3)
GLUCOSE SERPL-MCNC: 95 MG/DL — SIGNIFICANT CHANGE UP (ref 70–99)
MAGNESIUM SERPL-MCNC: 1.7 MG/DL — SIGNIFICANT CHANGE UP (ref 1.6–2.6)
PHOSPHATE SERPL-MCNC: 3 MG/DL — SIGNIFICANT CHANGE UP (ref 2.5–4.5)
POTASSIUM SERPL-MCNC: 4.3 MMOL/L — SIGNIFICANT CHANGE UP (ref 3.5–5.3)
POTASSIUM SERPL-SCNC: 4.3 MMOL/L — SIGNIFICANT CHANGE UP (ref 3.5–5.3)
PROT SERPL-MCNC: 5.7 G/DL — LOW (ref 6–8.3)
SODIUM SERPL-SCNC: 142 MMOL/L — SIGNIFICANT CHANGE UP (ref 135–145)

## 2017-11-09 RX ORDER — TAMSULOSIN HYDROCHLORIDE 0.4 MG/1
1 CAPSULE ORAL
Qty: 0 | Refills: 0 | COMMUNITY
Start: 2017-11-09

## 2017-11-09 RX ADMIN — HEPARIN SODIUM 5000 UNIT(S): 5000 INJECTION INTRAVENOUS; SUBCUTANEOUS at 05:23

## 2017-11-09 RX ADMIN — Medication 100 MICROGRAM(S): at 05:23

## 2017-11-09 NOTE — PROGRESS NOTE ADULT - PROBLEM SELECTOR PLAN 4
--suspect pre-renal in setting of infection, may be some component of post-renal given prolonged obstructive symptoms  cont deshpande  --continuing flomax  --trend Cr, avoid nephrotoxins,  renal c/s appreciated
--suspect pre-renal in setting of infection, may be some component of post-renal given prolonged obstructive symptoms  --will re-check post-void residual, consider deshpande  --continuing flomax  --trend Cr, avoid nephrotoxins, urine Na, Cr  renal c/s appreciated
-- suspect pre-renal in setting of infection, may be some component of post-renal given prolonged obstructive symptoms  -- renal f/u appreciated.   -- continuing flomax  -- trend Cr, avoid nephrotoxins,  -- renal f/u noted
-- suspect pre-renal in setting of infection, may be some component of post-renal given prolonged obstructive symptoms  -- renal f/u appreciated.   -- continuing flomax  -- trend Cr, avoid nephrotoxins,  -- renal f/u noted
--suspect pre-renal in setting of infection, may be some component of post-renal given prolonged obstructive symptoms  cont deshpande  --continuing flomax  --trend Cr, avoid nephrotoxins,  renal f/u noted

## 2017-11-09 NOTE — PROGRESS NOTE ADULT - PROBLEM SELECTOR PLAN 2
--continue ceftriaxone  --no clinical signs of pyelo (no flank pain, fevers, nausea, vomiting)  --repeat UA, f/u urine cultures neg(pt on cipro outpt)  ID c/s
--continue ceftriaxone  --no clinical signs of pyelo (no flank pain, fevers, nausea, vomiting)  --NS @ 125 overnight  --repeat UA, f/u urine cultures neg(pt on cipro outpt)
--continue ceftriaxone  --no clinical signs of pyelo (no flank pain, fevers, nausea, vomiting)  --repeat UA, f/u urine cultures neg(pt on cipro outpt)  ID c/s
--continue ceftriaxone IV  --no clinical signs of pyelo (no flank pain, fevers, nausea, vomiting)  --repeat UA, f/u urine cultures neg (pt on cipro outpt)  -- ID f/u appreciated.
--stable off abx per ID. Pt s/p IV Ceftriaxone.   --no clinical signs of pyelo (no flank pain, fevers, nausea, vomiting)  --repeat UA, f/u urine cultures neg (pt on cipro outpt)  -- ID f/u appreciated. D/w ID.  -- can be d/c home without abx.   -- outpt heme/onc f/u.

## 2017-11-09 NOTE — DISCHARGE NOTE ADULT - CARE PROVIDER_API CALL
Steffany Bridges), Internal Medicine  [address] [facility]  [address]  [place] [address]  Phone: [phone]  Fax: [phone]    Henrique Jackson), Internal Medicine; Nephrology  [address] [facility] [address]  [place] [address]  Phone: [phone]  Fax: [phone]

## 2017-11-09 NOTE — PROGRESS NOTE ADULT - ATTENDING COMMENTS
Rain Weller MD ( [facility])  [phone]
Rain Weller MD ( [facility])  [phone]
- Dr. ADRIA Beck ([facility])  - [phone]
D/c planning home with home PT.     - Dr. ADRIA Beck ([facility])  - [phone]
Rain Weller MD- [facility]  [phone]    Dr Beck will be covering me starting 11/8/17  Please page

## 2017-11-09 NOTE — PROGRESS NOTE ADULT - PROBLEM SELECTOR PLAN 1
--unclear etiology, suspect vasovagal syncope in setting of infection, less concern for cardiac event/seizure  monitor orthostatics, dec ivf  --continue telemetry
--unclear etiology, suspect vasovagal syncope in setting of infection, less concern for cardiac event/seizure  +orthostasis by HR, cont ivf  --continue telemetry
--unclear etiology, suspect vasovagal syncope in setting of infection, less concern for cardiac event/seizure  monitor orthostatics, dec ivf  --continue telemetry
suspect vasovagal syncope vs. hypovolemia from infection, less concern for cardiac event/seizure  monitor orthostatics, s/p IVF  --continue telemetry
suspect vasovagal syncope vs. hypovolemia from infection, less concern for cardiac event/seizure  monitor orthostatics, s/p IVF  tele with no events.

## 2017-11-09 NOTE — DISCHARGE NOTE ADULT - HOSPITAL COURSE
This is an 89 year old gentleman with history of HTN/hypothyroidism/prostate CA presented to ER after an episode of a witnessed  syncopal episode.  Found to be in PRANAV - Nephrology consulted:  PRANAV - likely multifactorial- prerenal, some degree of obstructive nephropathy; post UTI - improved. Proteinuria - unclear exact etiology - paraneoplastic? Nonnephrotic range. Not indicated for renal biopsy as creatinine is improving. Hypovolemic on presentation - resolving. Metabolic acidosis, bicarbonates 20 today- improving- likely due to PRANAV  Patient stable off antibiotics. Creatinine improving 89 year old gentleman with history of HTN/hypothyroidism/prostate CA presented to ER after an episode of a witnessed  syncopal episode.  Found to be in PRANAV - Nephrology consulted:  PRANAV - likely multifactorial- prerenal, some degree of obstructive nephropathy; post UTI - improved. Proteinuria - unclear exact etiology - paraneoplastic? Nonnephrotic range. Not indicated for renal biopsy as creatinine is improving. Hypovolemic on presentation - resolving. Metabolic acidosis, bicarbonates 20 today- improving- likely due to PRANAV  Patient stable off antibiotics. Creatinine improving

## 2017-11-09 NOTE — PROGRESS NOTE ADULT - SUBJECTIVE AND OBJECTIVE BOX
Nephrology Progress Note    No events overnight  patient looks comfortable and not in distress  Denies any pain or sob  Deshpande removed and able to void    VITAL:  Afebrile, /52, saturating well at 95%    PHYSICAL EXAM:  Constitutional: cachectic male, in no distress, awake and alert  HEENT: NCAT, MMM  Neck: Supple, No JVD  Respiratory: CTA-b/l, normal effort  Cardiovascular: RRR s1s2, no m/r/g  Gastrointestinal: BS+, soft, NT/ND  Extremities: No peripheral edema b/l  Neurological: no focal deficits; strength grossly intact  : no deshpande  Back: no CVAT b/l  Skin: No rashes, no nevi; (+)pale      LABS:             Noted leukocytosis  Anemia with Hgb 8.7  Iron stores, B12 and folate acceptable  Cr up to 0.98 today    Na(138)/K(4.1)/Cl(107)/HCO3(19)/BUN(25)/Cr(1.11)Glu(90)/Ca(8.0)/Mg(--)/PO4(--)    11-06 @ 07:09  Na(139)/K(4.1)/Cl(106)/HCO3(15)/BUN(36)/Cr(1.83)Glu(82)/Ca(8.3)/Mg(--)/PO4(--)    11-05 @ 08:17  Na(140)/K(4.2)/Cl(103)/HCO3(21)/BUN(40)/Cr(2.43)Glu(124)/Ca(8.5)/Mg(--)/PO4(--)    11-04 @ 20:02    urine protein/creatinine 1.6g/g    Imaging: Renal US: IMPRESSION:  No renal stones or hydronephrosis.  No perinephric collection.    IMPRESSION: 89M w/ HTN, hypothyroidism, and prostate CA, 11/4/17 admitted s/p episode of LOC, and with PRANAV    1. PRANAV - likely multifactorial- prerenal, some degree of obstructive nephropathy; post UTI - improved   2. Proteinuria - unclear exact etiology - paraneoplastic? Nonnephrotic range. Not indicated for renal biopsy as creatinine is improving.  3. Hypovolemic on presentation - resolving  4. Anemia- likely of chronic disease  5. Metabolic acidosis, bicarbonates 20 today- improving- likely due to PRANAV      RECOMMEND:  Kidney function improving- continue current management  - continue to monitor kidney function while inpatient  - dose meds per GFR 45-55ml/min  - avoid nephrotoxins  - will need hematology workup for anemia - workup unrevealing here    Rest of management per primary team    We will follow his numbers peripherally while inpatient    Sonido Worley MD  [facility]  [phone]

## 2017-11-09 NOTE — DISCHARGE NOTE ADULT - ADDITIONAL INSTRUCTIONS
Make appointments to follow up with your physicians  Bring all discharge paperwork including discharge medication list to follow up appointments  You are being discharged home with home physical therapy

## 2017-11-09 NOTE — PROGRESS NOTE ADULT - PROBLEM SELECTOR PROBLEM 2
Complicated urinary tract infection

## 2017-11-09 NOTE — PROGRESS NOTE ADULT - PROBLEM SELECTOR PLAN 3
--now improved s/p 3L NS  --suspect related to UTI/sepsis  --trend q4h vitals  --holding home antihypertensives, bp stable off meds
--now improved s/p 3L NS  --suspect related to UTI  --trend q4h vitals  --holding home antihypertensives, please reconcile meds in AM
--now improved s/p 3L NS  --suspect related to UTI/sepsis    --holding home antihypertensives, bp stable off meds
--now improved s/p 3L NS  --suspect related to UTI/sepsis    --holding home antihypertensives, bp stable off meds
--now improved s/p 3L NS  --suspect related to UTI/sepsis  --trend q4h vitals  --holding home antihypertensives, bp stable off meds

## 2017-11-09 NOTE — DISCHARGE NOTE ADULT - PATIENT PORTAL LINK FT
“You can access the [facility] Patient Portal, offered by [facility], by registering with the following website: [email or web address]”

## 2017-11-09 NOTE — DISCHARGE NOTE ADULT - CARE PLAN
Principal Discharge DX:	Syncope  Goal:	no further events  Instructions for follow-up, activity and diet:	Fainting usually is caused by fear, stress, pain, standing too long, over tired, overheated, going to bathroom, or coughing  Blood pressure can drop if you do not drink enough, blood pressure medication, alcohol, bleeding,  Consult with your doctor about driving  Avoid activity or condition that causes your syncope  Lay down with your feet up when you feel like you might faint  Secondary Diagnosis:	Urinary retention  Goal:	resolved  Instructions for follow-up, activity and diet:	follow up with your physicians after discharge  discuss with your primary care physician a referral for a urologist / nephrologist  Secondary Diagnosis:	Acute kidney injury  Goal:	resolved  Instructions for follow-up, activity and diet:	follow up with your physicians after discharge  discuss with your primary care physician a referral for a urologist / nephrologist

## 2017-11-09 NOTE — DISCHARGE NOTE ADULT - MEDICATION SUMMARY - MEDICATIONS TO TAKE
I will START or STAY ON the medications listed below when I get home from the hospital:    tamsulosin 0.4 mg oral capsule  -- 1 cap(s) by mouth once a day (at bedtime)  -- Indication: For Urinary retention    atorvastatin 10 mg oral tablet  -- 1 tab(s) by mouth once a day (at bedtime)  -- Indication: For Cholesterol management    clobetasol 0.05% topical ointment  -- Apply on skin to affected area 2 times a day  -- apply to LE, s/p basal carinoma cell bx  -- Indication: For ointment    mupirocin 2% topical ointment  -- Apply on skin to affected area 2 times a day  -- applied to LE (s/p basal cell carcinoma Bx)  -- Indication: For ointment    latanoprost 0.005% ophthalmic solution  -- 1 drop(s) to both eyes once a day (in the evening)  -- Indication: For eye drops    Synthroid 100 mcg (0.1 mg) oral tablet  -- 1 tab(s) by mouth once a day  -- Indication: For Hypothyroid

## 2017-11-09 NOTE — PROGRESS NOTE ADULT - SUBJECTIVE AND OBJECTIVE BOX
Patient is a 89y old  Male who presents with a chief complaint of syncope (04 Nov 2017 22:54)      SUBJECTIVE / OVERNIGHT EVENTS:  Pt seen and examined at bedside.   No overnight event.  Feeling better.  no cp, no sob, no n/v/d.   no dysuria.   He wants to go home.       Vital Signs Last 24 Hrs  T(C): 36.7 (09 Nov 2017 11:11), Max: 36.8 (08 Nov 2017 21:26)  T(F): 98.1 (09 Nov 2017 11:11), Max: 98.3 (08 Nov 2017 21:26)  HR: 63 (09 Nov 2017 11:11) (59 - 68)  BP: 115/63 (09 Nov 2017 11:11) (110/47 - 143/64)  BP(mean): --  RR: 17 (09 Nov 2017 11:11) (17 - 18)  SpO2: 99% (09 Nov 2017 11:11) (95% - 99%)  I&O's Summary    08 Nov 2017 07:01  -  09 Nov 2017 07:00  --------------------------------------------------------  IN: 1100 mL / OUT: 25 mL / NET: 1075 mL    09 Nov 2017 07:01  -  09 Nov 2017 14:52  --------------------------------------------------------  IN: 840 mL / OUT: 175 mL / NET: 665 mL        PHYSICAL EXAM:  GENERAL: NAD, Comfortable  HEAD:  Atraumatic, Normocephalic  EYES: EOMI, PERRLA, conjunctiva and sclera clear  NECK: Supple, No JVD  CHEST/LUNG: Clear to auscultation bilaterally; No wheeze  HEART: Regular rate and rhythm; No murmurs, rubs, or gallops  ABDOMEN: Soft, Nontender, Nondistended; Bowel sounds present  Neuro: AAO x 3, no focal deficit, 5/5 b/l extremities  EXTREMITIES:  2+ Peripheral Pulses, No clubbing, cyanosis, or edema  SKIN: No rashes or lesions    LABS:                        8.7    12.78 )-----------( 305      ( 08 Nov 2017 07:40 )             25.9     11-09    142  |  108  |  20  ----------------------------<  95  4.3   |  21<L>  |  0.98    Ca    8.3<L>      09 Nov 2017 07:30  Phos  3.0     11-09  Mg     1.7     11-09    TPro  5.7<L>  /  Alb  2.7<L>  /  TBili  0.2  /  DBili  x   /  AST  35  /  ALT  8<L>  /  AlkPhos  67  11-09      CAPILLARY BLOOD GLUCOSE                RADIOLOGY & ADDITIONAL TESTS:    Imaging Personally Reviewed:  [x] YES  [ ] NO    Consultant(s) Notes Reviewed:  [x] YES  [ ] NO      MEDICATIONS  (STANDING):  atorvastatin 10 milliGRAM(s) Oral at bedtime  heparin  Injectable 5000 Unit(s) SubCutaneous every 12 hours  influenza   Vaccine 0.5 milliLiter(s) IntraMuscular once  latanoprost 0.005% Ophthalmic Solution 1 Drop(s) Both EYES at bedtime  levothyroxine 100 MICROGram(s) Oral daily  tamsulosin 0.4 milliGRAM(s) Oral at bedtime    MEDICATIONS  (PRN):      Care Discussed with Consultants/Other Providers [x] YES  [ ] NO    HEALTH ISSUES - PROBLEM Dx:  Urinary retention: Urinary retention  Anemia, unspecified type: Anemia, unspecified type  Leukocytosis, unspecified type: Leukocytosis, unspecified type  Nutrition, metabolism, and development symptoms: Nutrition, metabolism, and development symptoms  Prophylactic measure: Prophylactic measure  Hypothyroid: Hypothyroid  Hyperlipidemia: Hyperlipidemia  Acute kidney injury: Acute kidney injury  Hypotension: Hypotension  Complicated urinary tract infection: Complicated urinary tract infection  Syncope, unspecified syncope type: Syncope, unspecified syncope type

## 2017-11-09 NOTE — PROGRESS NOTE ADULT - PROBLEM SELECTOR PROBLEM 1
Syncope, unspecified syncope type

## 2017-11-09 NOTE — DISCHARGE NOTE ADULT - MEDICATION SUMMARY - MEDICATIONS TO STOP TAKING
I will STOP taking the medications listed below when I get home from the hospital:    losartan 100 mg oral tablet  -- 1 tab(s) by mouth once a day    cephalexin 250 mg oral capsule  -- 1 cap(s) by mouth every 8 hours    cephalexin 500 mg oral tablet  -- 1 tab(s) by mouth 2 times a day   -- Finish all this medication unless otherwise directed by prescriber.

## 2017-11-09 NOTE — PROGRESS NOTE ADULT - ASSESSMENT
This is an 89 year old gentleman with history of HTN/hypothyroidism/prostate CA BIBA for syncopal episode.
89 year old gentleman with history of HTN/hypothyroidism/prostate CA BIBA for syncopal episode.
89 year old gentleman with history of HTN/hypothyroidism/prostate CA BIBA for syncopal episode.
This is an 89 year old gentleman with history of HTN/hypothyroidism/prostate CA BIBA for syncopal episode.
This is an 89 year old gentleman with history of HTN/hypothyroidism/prostate CA BIBA for syncopal episode.

## 2017-11-09 NOTE — DISCHARGE NOTE ADULT - PLAN OF CARE
no further events Fainting usually is caused by fear, stress, pain, standing too long, over tired, overheated, going to bathroom, or coughing  Blood pressure can drop if you do not drink enough, blood pressure medication, alcohol, bleeding,  Consult with your doctor about driving  Avoid activity or condition that causes your syncope  Lay down with your feet up when you feel like you might faint resolved follow up with your physicians after discharge  discuss with your primary care physician a referral for a urologist / nephrologist

## 2017-11-09 NOTE — PROGRESS NOTE ADULT - PROBLEM SELECTOR PLAN 6
--continue synthroid 100

## 2017-11-09 NOTE — DISCHARGE NOTE ADULT - FINDINGS/TREATMENT
11/6/17: Renal Ultrasound: Right kidney:  9.6 cm. No renal mass, hydronephrosis or calculi.  Left kidney:  9.4 cm. No hydronephrosis or calculi. Upper pole 1.1 cm parapelvic cyst.  Urinary bladder: Collapsed around Lopez catheter.  IMPRESSION: No renal stones or hydronephrosis.  No perinephric collection. 11/5 Blood Cultures  - No Growth   11/6 Urine cultures - No Growth

## 2017-11-09 NOTE — PROGRESS NOTE ADULT - PROVIDER SPECIALTY LIST ADULT
Internal Medicine
Nephrology
Internal Medicine
Internal Medicine

## 2017-11-10 LAB
CULTURE RESULTS: SIGNIFICANT CHANGE UP
SPECIMEN SOURCE: SIGNIFICANT CHANGE UP

## 2017-11-26 NOTE — H&P ADULT - PROBLEM SELECTOR PROBLEM 7
Normal rate, regular rhythm.  Heart sounds S1, S2.  No murmurs, rubs or gallops.
Prophylactic measure

## 2017-12-19 PROCEDURE — 84132 ASSAY OF SERUM POTASSIUM: CPT

## 2017-12-19 PROCEDURE — 82803 BLOOD GASES ANY COMBINATION: CPT

## 2017-12-19 PROCEDURE — 83615 LACTATE (LD) (LDH) ENZYME: CPT

## 2017-12-19 PROCEDURE — 87040 BLOOD CULTURE FOR BACTERIA: CPT

## 2017-12-19 PROCEDURE — 85014 HEMATOCRIT: CPT

## 2017-12-19 PROCEDURE — 82570 ASSAY OF URINE CREATININE: CPT

## 2017-12-19 PROCEDURE — 96374 THER/PROPH/DIAG INJ IV PUSH: CPT

## 2017-12-19 PROCEDURE — 99285 EMERGENCY DEPT VISIT HI MDM: CPT | Mod: 25

## 2017-12-19 PROCEDURE — 84133 ASSAY OF URINE POTASSIUM: CPT

## 2017-12-19 PROCEDURE — 83735 ASSAY OF MAGNESIUM: CPT

## 2017-12-19 PROCEDURE — 83605 ASSAY OF LACTIC ACID: CPT

## 2017-12-19 PROCEDURE — 82607 VITAMIN B-12: CPT

## 2017-12-19 PROCEDURE — 82746 ASSAY OF FOLIC ACID SERUM: CPT

## 2017-12-19 PROCEDURE — 85027 COMPLETE CBC AUTOMATED: CPT

## 2017-12-19 PROCEDURE — 80053 COMPREHEN METABOLIC PANEL: CPT

## 2017-12-19 PROCEDURE — 87086 URINE CULTURE/COLONY COUNT: CPT

## 2017-12-19 PROCEDURE — 84300 ASSAY OF URINE SODIUM: CPT

## 2017-12-19 PROCEDURE — 82436 ASSAY OF URINE CHLORIDE: CPT

## 2017-12-19 PROCEDURE — 84484 ASSAY OF TROPONIN QUANT: CPT

## 2017-12-19 PROCEDURE — 80048 BASIC METABOLIC PNL TOTAL CA: CPT

## 2017-12-19 PROCEDURE — 93005 ELECTROCARDIOGRAM TRACING: CPT

## 2017-12-19 PROCEDURE — 81001 URINALYSIS AUTO W/SCOPE: CPT

## 2017-12-19 PROCEDURE — 97161 PT EVAL LOW COMPLEX 20 MIN: CPT

## 2017-12-19 PROCEDURE — 82728 ASSAY OF FERRITIN: CPT

## 2017-12-19 PROCEDURE — 84295 ASSAY OF SERUM SODIUM: CPT

## 2017-12-19 PROCEDURE — 84156 ASSAY OF PROTEIN URINE: CPT

## 2017-12-19 PROCEDURE — 82947 ASSAY GLUCOSE BLOOD QUANT: CPT

## 2017-12-19 PROCEDURE — 83550 IRON BINDING TEST: CPT

## 2017-12-19 PROCEDURE — 82435 ASSAY OF BLOOD CHLORIDE: CPT

## 2017-12-19 PROCEDURE — 84466 ASSAY OF TRANSFERRIN: CPT

## 2017-12-19 PROCEDURE — 82330 ASSAY OF CALCIUM: CPT

## 2017-12-19 PROCEDURE — 84100 ASSAY OF PHOSPHORUS: CPT

## 2017-12-19 PROCEDURE — 76775 US EXAM ABDO BACK WALL LIM: CPT

## 2018-02-01 ENCOUNTER — INPATIENT (INPATIENT)
Facility: HOSPITAL | Age: 83
LOS: 7 days | Discharge: HOME | DRG: 193 | End: 2018-02-09
Attending: STUDENT IN AN ORGANIZED HEALTH CARE EDUCATION/TRAINING PROGRAM | Admitting: STUDENT IN AN ORGANIZED HEALTH CARE EDUCATION/TRAINING PROGRAM
Payer: COMMERCIAL

## 2018-02-01 ENCOUNTER — EMERGENCY (EMERGENCY)
Facility: HOSPITAL | Age: 83
LOS: 0 days | Discharge: HOSPICE | End: 2018-02-01
Attending: EMERGENCY MEDICINE
Payer: MEDICARE

## 2018-02-01 VITALS
TEMPERATURE: 99 F | OXYGEN SATURATION: 97 % | HEART RATE: 71 BPM | HEIGHT: 72 IN | WEIGHT: 160.06 LBS | SYSTOLIC BLOOD PRESSURE: 118 MMHG | RESPIRATION RATE: 17 BRPM | DIASTOLIC BLOOD PRESSURE: 39 MMHG

## 2018-02-01 VITALS
RESPIRATION RATE: 16 BRPM | DIASTOLIC BLOOD PRESSURE: 64 MMHG | SYSTOLIC BLOOD PRESSURE: 144 MMHG | HEART RATE: 80 BPM | OXYGEN SATURATION: 98 % | TEMPERATURE: 98 F

## 2018-02-01 VITALS
SYSTOLIC BLOOD PRESSURE: 144 MMHG | HEART RATE: 86 BPM | OXYGEN SATURATION: 98 % | RESPIRATION RATE: 18 BRPM | DIASTOLIC BLOOD PRESSURE: 78 MMHG

## 2018-02-01 DIAGNOSIS — R55 SYNCOPE AND COLLAPSE: ICD-10-CM

## 2018-02-01 DIAGNOSIS — R05 COUGH: ICD-10-CM

## 2018-02-01 DIAGNOSIS — E78.4 OTHER HYPERLIPIDEMIA: ICD-10-CM

## 2018-02-01 DIAGNOSIS — E03.9 HYPOTHYROIDISM, UNSPECIFIED: ICD-10-CM

## 2018-02-01 DIAGNOSIS — J11.1 INFLUENZA DUE TO UNIDENTIFIED INFLUENZA VIRUS WITH OTHER RESPIRATORY MANIFESTATIONS: ICD-10-CM

## 2018-02-01 DIAGNOSIS — R74.8 ABNORMAL LEVELS OF OTHER SERUM ENZYMES: ICD-10-CM

## 2018-02-01 DIAGNOSIS — J10.1 INFLUENZA DUE TO OTHER IDENTIFIED INFLUENZA VIRUS WITH OTHER RESPIRATORY MANIFESTATIONS: ICD-10-CM

## 2018-02-01 DIAGNOSIS — G30.9 ALZHEIMER'S DISEASE, UNSPECIFIED: ICD-10-CM

## 2018-02-01 DIAGNOSIS — I62.00 NONTRAUMATIC SUBDURAL HEMORRHAGE, UNSPECIFIED: ICD-10-CM

## 2018-02-01 DIAGNOSIS — S37.009A UNSPECIFIED INJURY OF UNSPECIFIED KIDNEY, INITIAL ENCOUNTER: ICD-10-CM
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
## 2018-02-01 DIAGNOSIS — R82.90 UNSPECIFIED ABNORMAL FINDINGS IN URINE: ICD-10-CM

## 2018-02-01 DIAGNOSIS — R53.1 WEAKNESS: ICD-10-CM
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
## 2018-02-01 LAB
ALBUMIN SERPL ELPH-MCNC: 3.1 G/DL — LOW (ref 3.3–5)
ALP SERPL-CCNC: 90 U/L — SIGNIFICANT CHANGE UP (ref 40–120)
ALT FLD-CCNC: 18 U/L — SIGNIFICANT CHANGE UP (ref 12–78)
ANION GAP SERPL CALC-SCNC: 6 MMOL/L — SIGNIFICANT CHANGE UP (ref 5–17)
ANISOCYTOSIS BLD QL: SLIGHT — SIGNIFICANT CHANGE UP
APPEARANCE UR: ABNORMAL
APTT BLD: 28.2 SEC — SIGNIFICANT CHANGE UP (ref 27.5–37.4)
AST SERPL-CCNC: 29 U/L — SIGNIFICANT CHANGE UP (ref 15–37)
BACTERIA # UR AUTO: ABNORMAL
BASOPHILS # BLD AUTO: 0 K/UL — SIGNIFICANT CHANGE UP (ref 0–0.2)
BASOPHILS NFR BLD AUTO: 0 % — SIGNIFICANT CHANGE UP (ref 0–2)
BILIRUB SERPL-MCNC: 0.3 MG/DL — SIGNIFICANT CHANGE UP (ref 0.2–1.2)
BILIRUB UR-MCNC: NEGATIVE — SIGNIFICANT CHANGE UP
BLD GP AB SCN SERPL QL: NEGATIVE — SIGNIFICANT CHANGE UP
BUN SERPL-MCNC: 30 MG/DL — HIGH (ref 7–23)
CALCIUM SERPL-MCNC: 8 MG/DL — LOW (ref 8.5–10.1)
CHLORIDE SERPL-SCNC: 107 MMOL/L — SIGNIFICANT CHANGE UP (ref 96–108)
CK MB BLD-MCNC: 0.7 % — SIGNIFICANT CHANGE UP (ref 0–3.5)
CK MB BLD-MCNC: 1.6 % — SIGNIFICANT CHANGE UP (ref 0–3.5)
CK MB BLD-MCNC: 1.9 % — SIGNIFICANT CHANGE UP (ref 0–3.5)
CK MB CFR SERPL CALC: 2.1 NG/ML — SIGNIFICANT CHANGE UP (ref 0.5–3.6)
CK MB CFR SERPL CALC: 4 NG/ML — SIGNIFICANT CHANGE UP (ref 0–6.7)
CK MB CFR SERPL CALC: 4.2 NG/ML — SIGNIFICANT CHANGE UP (ref 0–6.7)
CK SERPL-CCNC: 222 U/L — HIGH (ref 30–200)
CK SERPL-CCNC: 257 U/L — HIGH (ref 30–200)
CK SERPL-CCNC: 292 U/L — SIGNIFICANT CHANGE UP (ref 26–308)
CO2 SERPL-SCNC: 25 MMOL/L — SIGNIFICANT CHANGE UP (ref 22–31)
COLOR SPEC: YELLOW — SIGNIFICANT CHANGE UP
CREAT SERPL-MCNC: 1.47 MG/DL — HIGH (ref 0.5–1.3)
DIFF PNL FLD: ABNORMAL
EOSINOPHIL # BLD AUTO: 0 K/UL — SIGNIFICANT CHANGE UP (ref 0–0.5)
EOSINOPHIL NFR BLD AUTO: 0 % — SIGNIFICANT CHANGE UP (ref 0–6)
EPI CELLS # UR: SIGNIFICANT CHANGE UP
FLUAV H1 2009 PAND RNA SPEC QL NAA+PROBE: DETECTED
FLUAV SPEC QL CULT: POSITIVE
FLUBV AG SPEC QL IA: NEGATIVE — SIGNIFICANT CHANGE UP
GLUCOSE BLDC GLUCOMTR-MCNC: 98 MG/DL — SIGNIFICANT CHANGE UP (ref 70–99)
GLUCOSE SERPL-MCNC: 96 MG/DL — SIGNIFICANT CHANGE UP (ref 70–99)
GLUCOSE UR QL: NEGATIVE MG/DL — SIGNIFICANT CHANGE UP
HCT VFR BLD CALC: 32 % — LOW (ref 39–50)
HGB BLD-MCNC: 10.5 G/DL — LOW (ref 13–17)
HYPOCHROMIA BLD QL: SLIGHT — SIGNIFICANT CHANGE UP
INR BLD: 1.06 RATIO — SIGNIFICANT CHANGE UP (ref 0.88–1.16)
KETONES UR-MCNC: NEGATIVE — SIGNIFICANT CHANGE UP
LACTATE SERPL-SCNC: 0.8 MMOL/L — SIGNIFICANT CHANGE UP (ref 0.7–2)
LEUKOCYTE ESTERASE UR-ACNC: ABNORMAL
LYMPHOCYTES # BLD AUTO: 3.97 K/UL — HIGH (ref 1–3.3)
LYMPHOCYTES # BLD AUTO: 36 % — SIGNIFICANT CHANGE UP (ref 13–44)
MACROCYTES BLD QL: SLIGHT — SIGNIFICANT CHANGE UP
MANUAL SMEAR VERIFICATION: SIGNIFICANT CHANGE UP
MCHC RBC-ENTMCNC: 29.8 PG — SIGNIFICANT CHANGE UP (ref 27–34)
MCHC RBC-ENTMCNC: 32.8 GM/DL — SIGNIFICANT CHANGE UP (ref 32–36)
MCV RBC AUTO: 90.9 FL — SIGNIFICANT CHANGE UP (ref 80–100)
MONOCYTES # BLD AUTO: 0.88 K/UL — SIGNIFICANT CHANGE UP (ref 0–0.9)
MONOCYTES NFR BLD AUTO: 8 % — SIGNIFICANT CHANGE UP (ref 2–14)
NEUTROPHILS # BLD AUTO: 6.18 K/UL — SIGNIFICANT CHANGE UP (ref 1.8–7.4)
NEUTROPHILS NFR BLD AUTO: 54 % — SIGNIFICANT CHANGE UP (ref 43–77)
NEUTS BAND # BLD: 2 — SIGNIFICANT CHANGE UP
NITRITE UR-MCNC: NEGATIVE — SIGNIFICANT CHANGE UP
NRBC # BLD: 0 — SIGNIFICANT CHANGE UP
NRBC # BLD: SIGNIFICANT CHANGE UP /100 WBCS (ref 0–0)
PH UR: 6 — SIGNIFICANT CHANGE UP (ref 5–8)
PLAT MORPH BLD: NORMAL — SIGNIFICANT CHANGE UP
PLATELET # BLD AUTO: 324 K/UL — SIGNIFICANT CHANGE UP (ref 150–400)
POLYCHROMASIA BLD QL SMEAR: SLIGHT — SIGNIFICANT CHANGE UP
POTASSIUM SERPL-MCNC: 4.7 MMOL/L — SIGNIFICANT CHANGE UP (ref 3.5–5.3)
POTASSIUM SERPL-SCNC: 4.7 MMOL/L — SIGNIFICANT CHANGE UP (ref 3.5–5.3)
PROT SERPL-MCNC: 7 GM/DL — SIGNIFICANT CHANGE UP (ref 6–8.3)
PROT UR-MCNC: 100 MG/DL
PROTHROM AB SERPL-ACNC: 11.6 SEC — SIGNIFICANT CHANGE UP (ref 9.8–12.7)
RAPID RVP RESULT: DETECTED
RBC # BLD: 3.52 M/UL — LOW (ref 4.2–5.8)
RBC # FLD: 13.8 % — SIGNIFICANT CHANGE UP (ref 10.3–14.5)
RBC BLD AUTO: ABNORMAL
RBC CASTS # UR COMP ASSIST: ABNORMAL /HPF (ref 0–4)
RH IG SCN BLD-IMP: NEGATIVE — SIGNIFICANT CHANGE UP
SODIUM SERPL-SCNC: 138 MMOL/L — SIGNIFICANT CHANGE UP (ref 135–145)
SP GR SPEC: 1.01 — SIGNIFICANT CHANGE UP (ref 1.01–1.02)
TROPONIN I SERPL-MCNC: 0.37 NG/ML — HIGH (ref 0.01–0.04)
TROPONIN T SERPL-MCNC: 0.1 NG/ML — HIGH (ref 0–0.06)
TROPONIN T SERPL-MCNC: 0.13 NG/ML — HIGH (ref 0–0.06)
UROBILINOGEN FLD QL: NEGATIVE MG/DL — SIGNIFICANT CHANGE UP
WBC # BLD: 11.04 K/UL — HIGH (ref 3.8–10.5)
WBC # FLD AUTO: 11.04 K/UL — HIGH (ref 3.8–10.5)
WBC UR QL: ABNORMAL

## 2018-02-01 PROCEDURE — 70450 CT HEAD/BRAIN W/O DYE: CPT | Mod: 26,77

## 2018-02-01 PROCEDURE — 99223 1ST HOSP IP/OBS HIGH 75: CPT

## 2018-02-01 PROCEDURE — 70450 CT HEAD/BRAIN W/O DYE: CPT | Mod: 26

## 2018-02-01 PROCEDURE — 99291 CRITICAL CARE FIRST HOUR: CPT

## 2018-02-01 PROCEDURE — 93010 ELECTROCARDIOGRAM REPORT: CPT

## 2018-02-01 PROCEDURE — 72125 CT NECK SPINE W/O DYE: CPT | Mod: 26

## 2018-02-01 PROCEDURE — 71045 X-RAY EXAM CHEST 1 VIEW: CPT | Mod: 26

## 2018-02-01 PROCEDURE — 99284 EMERGENCY DEPT VISIT MOD MDM: CPT | Mod: 25,GC

## 2018-02-01 PROCEDURE — 99231 SBSQ HOSP IP/OBS SF/LOW 25: CPT

## 2018-02-01 RX ORDER — ACETAMINOPHEN 500 MG
975 TABLET ORAL ONCE
Qty: 0 | Refills: 0 | Status: COMPLETED | OUTPATIENT
Start: 2018-02-01 | End: 2018-02-01

## 2018-02-01 RX ORDER — CEFTRIAXONE 500 MG/1
1 INJECTION, POWDER, FOR SOLUTION INTRAMUSCULAR; INTRAVENOUS ONCE
Qty: 0 | Refills: 0 | Status: COMPLETED | OUTPATIENT
Start: 2018-02-01 | End: 2018-02-01

## 2018-02-01 RX ORDER — SODIUM CHLORIDE 9 MG/ML
3 INJECTION INTRAMUSCULAR; INTRAVENOUS; SUBCUTANEOUS ONCE
Qty: 0 | Refills: 0 | Status: COMPLETED | OUTPATIENT
Start: 2018-02-01 | End: 2018-02-01

## 2018-02-01 RX ORDER — IPRATROPIUM/ALBUTEROL SULFATE 18-103MCG
3 AEROSOL WITH ADAPTER (GRAM) INHALATION EVERY 6 HOURS
Qty: 0 | Refills: 0 | Status: DISCONTINUED | OUTPATIENT
Start: 2018-02-01 | End: 2018-02-09

## 2018-02-01 RX ORDER — ACETAMINOPHEN 500 MG
650 TABLET ORAL EVERY 6 HOURS
Qty: 0 | Refills: 0 | Status: DISCONTINUED | OUTPATIENT
Start: 2018-02-01 | End: 2018-02-09

## 2018-02-01 RX ORDER — LATANOPROST 0.05 MG/ML
1 SOLUTION/ DROPS OPHTHALMIC; TOPICAL AT BEDTIME
Qty: 0 | Refills: 0 | Status: DISCONTINUED | OUTPATIENT
Start: 2018-02-01 | End: 2018-02-09

## 2018-02-01 RX ORDER — CEFTRIAXONE 500 MG/1
INJECTION, POWDER, FOR SOLUTION INTRAMUSCULAR; INTRAVENOUS
Qty: 0 | Refills: 0 | Status: DISCONTINUED | OUTPATIENT
Start: 2018-02-01 | End: 2018-02-08

## 2018-02-01 RX ORDER — ONDANSETRON 8 MG/1
4 TABLET, FILM COATED ORAL EVERY 6 HOURS
Qty: 0 | Refills: 0 | Status: DISCONTINUED | OUTPATIENT
Start: 2018-02-01 | End: 2018-02-09

## 2018-02-01 RX ORDER — SODIUM CHLORIDE 9 MG/ML
500 INJECTION INTRAMUSCULAR; INTRAVENOUS; SUBCUTANEOUS ONCE
Qty: 0 | Refills: 0 | Status: COMPLETED | OUTPATIENT
Start: 2018-02-01 | End: 2018-02-01

## 2018-02-01 RX ORDER — ATORVASTATIN CALCIUM 80 MG/1
10 TABLET, FILM COATED ORAL AT BEDTIME
Qty: 0 | Refills: 0 | Status: DISCONTINUED | OUTPATIENT
Start: 2018-02-01 | End: 2018-02-09

## 2018-02-01 RX ORDER — LEVOTHYROXINE SODIUM 125 MCG
100 TABLET ORAL DAILY
Qty: 0 | Refills: 0 | Status: DISCONTINUED | OUTPATIENT
Start: 2018-02-01 | End: 2018-02-02

## 2018-02-01 RX ORDER — TAMSULOSIN HYDROCHLORIDE 0.4 MG/1
0.4 CAPSULE ORAL AT BEDTIME
Qty: 0 | Refills: 0 | Status: DISCONTINUED | OUTPATIENT
Start: 2018-02-01 | End: 2018-02-09

## 2018-02-01 RX ORDER — CEFTRIAXONE 500 MG/1
INJECTION, POWDER, FOR SOLUTION INTRAMUSCULAR; INTRAVENOUS
Qty: 0 | Refills: 0 | Status: DISCONTINUED | OUTPATIENT
Start: 2018-02-01 | End: 2018-02-01

## 2018-02-01 RX ORDER — CEFTRIAXONE 500 MG/1
1 INJECTION, POWDER, FOR SOLUTION INTRAMUSCULAR; INTRAVENOUS EVERY 24 HOURS
Qty: 0 | Refills: 0 | Status: DISCONTINUED | OUTPATIENT
Start: 2018-02-02 | End: 2018-02-08

## 2018-02-01 RX ADMIN — Medication 30 MILLIGRAM(S): at 21:29

## 2018-02-01 RX ADMIN — TAMSULOSIN HYDROCHLORIDE 0.4 MILLIGRAM(S): 0.4 CAPSULE ORAL at 21:29

## 2018-02-01 RX ADMIN — CEFTRIAXONE 100 GRAM(S): 500 INJECTION, POWDER, FOR SOLUTION INTRAMUSCULAR; INTRAVENOUS at 20:59

## 2018-02-01 RX ADMIN — Medication 975 MILLIGRAM(S): at 15:44

## 2018-02-01 RX ADMIN — Medication 30 MILLIGRAM(S): at 11:04

## 2018-02-01 RX ADMIN — SODIUM CHLORIDE 500 MILLILITER(S): 9 INJECTION INTRAMUSCULAR; INTRAVENOUS; SUBCUTANEOUS at 08:12

## 2018-02-01 RX ADMIN — SODIUM CHLORIDE 3 MILLILITER(S): 9 INJECTION INTRAMUSCULAR; INTRAVENOUS; SUBCUTANEOUS at 07:02

## 2018-02-01 NOTE — CONSULT NOTE ADULT - ATTENDING COMMENTS
Pt seen and examined with resident and agree with evaluation and assessment.  Exam as above.  Pt had syncope x2, one resulted in fall.  Has possible other recent falls per daughters.  Pt transferred with dehydration and positive test for influenza A.  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  Pt has bicerebral atrophy.   Pending repeat CT.  If ok, then follow up in approx 2 weeks after discharge for re-imaging.  Pt has not been on blood thinners and would continue to avoid anticoagulation and antiplatelets.

## 2018-02-01 NOTE — ED ADULT TRIAGE NOTE - RESPIRATORY RATE (BREATHS/MIN)
Pre-Visit Chart Review  For Appointment Scheduled on 1/23/17.    Health Maintenance Due   Topic Date Due    TETANUS VACCINE  07/30/1983    Mammogram  09/09/2015                      18

## 2018-02-01 NOTE — ED PROVIDER NOTE - OBJECTIVE STATEMENT
89yo male transfer from [facility] s/p syncope. Pt. was found on floor by HHA this AM and when walking patient syncopized again caught by family, no trauma. Found to have SDH, flu, elevated troponin. Not on A/C. Pt. with cough, for 2 days.

## 2018-02-01 NOTE — ED PROVIDER NOTE - OBJECTIVE STATEMENT
Pertinent PMH/PSH/FHx/SHx and Review of Systems contained within:  89 yo m with PMH of dementia, HLD, HTN, melanoma in remission, prostate Ca in remission, hypothyroidism of BIBEMS for syncopal episodes yesterday and this AM. As per daughter, patient has been having cough, weakness, chest congestions for the last 2 days. Yesterday patient was found down on floor by HHA then this am when daughter got him out of bed to assist him to bathroom patient lost consciousness again but she prevented him from hitting floor. In ED, patient denies all complaints. No aggravating or relieving factors, No fever/chills, No photophobia/eye pain/changes in vision, No ear pain/sore throat/dysphagia, No chest pain/palpitations, no SOB/cough/wheeze/stridor, No abdominal pain, No N/V/D, no dysuria/frequency/discharge, No neck/back pain, no rash, no changes in neurological status/function.

## 2018-02-01 NOTE — ED PROVIDER NOTE - CARE PLAN
Principal Discharge DX:	Syncope Principal Discharge DX:	Syncope  Secondary Diagnosis:	Subdural hematoma  Secondary Diagnosis:	Influenza

## 2018-02-01 NOTE — H&P ADULT - HISTORY OF PRESENT ILLNESS
89yo male transfer from [facility] s/ syncope. Pt. was found on floor by HHA this AM and when walking patient syncopized again caught by family, no trauma. Found to have SDH, flu. Not on any A/C. Pt. with cough, for 2 days.  Positive influenza.      89yo male transfer from [facility] s/p syncope. Pt. was found on floor by HHA this AM and when walking patient syncopized again caught by family, no trauma. Found to have SDH, flu, elevated troponin. Not on A/C. Pt. with cough, for 2 days.        Chief Complaint: The patient is a 89y Male complaining of passed out.  - HPI Objective Statement: Pertinent PMH/PSH/FHx/SHx and Review of Systems  contained within:  88 yo m with PMH of dementia, HLD, HTN, melanoma in remission, prostate Ca in  remission, hypothyroidism of BIBEMS for syncopal episodes yesterday and this  AM. As per daughter, patient has been having cough, weakness, chest congestions  for the last 2 days. Yesterday patient was found down on floor by HHA then this  am when daughter got him out of bed to assist him to bathroom patient lost  consciousness again but she prevented him from hitting floor. In ED, patient  denies all complaints. No aggravating or relieving factors, No fever/chills, No  photophobia/eye pain/changes in vision, No ear pain/sore throat/dysphagia, No  chest pain/palpitations, no SOB/cough/wheeze/stridor, No abdominal pain, No  N/V/D, no dysuria/frequency/discharge, No neck/back pain, no rash, no changes  in neurological status/function. Medical History is taken from chart review from record from [facility] ER and from record from this hospital        89yo  patient withPMH of dementia, HLD, HTN, melanoma in remission, prostate Ca in remission, hypothyroidism was brought to [facility] on 2/1/18 due  for syncopal episodes ( X2)  yesterday and this AM.  Yesterday patient was found down on floor by HHA , then this am when daughter got him out of bed to assist him to bathroom patient lost consciousness again but she prevented him from hitting floor. No trauma is reported.   It was reported that patient patient has been having cough, weakness, chest congestions for the last 2 days.  At [facility], patient had CT of the head which was done and revealed : Thin acute right frontal Subdural hematoma without mass effect and patient was transferred to [facility] for further evaluation by the Neurosurgery team.  Patient was seen by the neurosurgery team and No acute neurosurgical intervention planned .       In the ED  :  Tm= 100.2F  HR = 63-86  BP= 129-144/49-78  SPO2= % RA  RR = 20-25  WBC = 11.0  Cr= 1.47 ( cr was up to 2.43 on 11/2017  Trop T= 0.10  U/A Abnormal  CXR : clear lungs   RVP pos influenza A  Patient was then admitted to medicine services   Patient denies any current complaints when seen

## 2018-02-01 NOTE — ED PROVIDER NOTE - PSYCHIATRIC, MLM
Alert and oriented to person, place, time/situation. normal mood and affect. no apparent risk to self or others. Alert and oriented to person, place normal mood and affect. no apparent risk to self or others.

## 2018-02-01 NOTE — ED ADULT NURSE NOTE - OBJECTIVE STATEMENT
88 y/o M sent to ED by from [facility] post syncopal episode. Aox4, states he does not know he had synopsized. Pt got up to go to the bathroom, his legs gave out, he started shaking, and eyes rolled behind his head this morning as per daughter. The daughter states she held him against the wall and he did not fall. Unknown time of LOC. No bruising or deformities noted. No external bleeding noted. Non-labored breathing, PEERL, able to move all extremities strong. Pt denies chest pain, SOB, chills, headache, dizziness, denies blood thinners. Pt had also fallen yesterday evening with confusion of incident and the home health aide found him on the ground as per daughter. Pt has a rectal temp of 100.1 with other vital signs in normal range and +RSV. ECG reads first degree heart block. Will continue to monitor.

## 2018-02-01 NOTE — CONSULT NOTE ADULT - ASSESSMENT
89 yo male history of dementia in ed after syncopal episode this am.  CTH showed possible tiny right frontal SDH. No on any AC.    -no acute neurosurgical intervention  -repeat CTH, if stable then reconsult neurosurgery and fu outpatient  -medicine for syncope

## 2018-02-01 NOTE — H&P ADULT - NSHPLABSRESULTS_GEN_ALL_CORE
lab result and Xrays were reviewed by me, ECG was not soon since I could not locate.  WBC = 11.0  Cr= 1.47 ( cr was up to 2.43 on 11/2017  Trop T= 0.10  U/A Abnormal  CXR : clear lungs   RVP pos influenza A.  Results of CT of the brain is reviewed .

## 2018-02-01 NOTE — H&P ADULT - MUSCULOSKELETAL
Pt calling for injection orders which she has been getting every three months ordered and authorized by Dr Nj.  Orders were entered and pt has radiology scheduling number to set it up.    Right ankle and subtalar Synvisc injections for WC injury   Last injection was 11/10/17.  Next injection due 2/10/18.  Orders have been entered.   detailed exam no joint swelling/no joint erythema/ROM intact

## 2018-02-01 NOTE — ED ADULT NURSE NOTE - OBJECTIVE STATEMENT
BIBA, syncope. pt does not recall passing out.  per EMS report,  home aide stated that he passed out but she caught him

## 2018-02-01 NOTE — H&P ADULT - ASSESSMENT
88yo  patient withPMH of dementia, HLD, HTN, melanoma in remission, prostate Ca in remission, hypothyroidism was brought to [facility] on 2/1/18 due  for syncopal episodes ( X2)  was transferred to [facility] due to thin acute frontal Subdural hematoma with no mass effect for Neurosurgical evaluation.  Patient was seen by the Neurosurgery team with no intervention planned.  Patient is also found to have positive influenza virus and abnormal urinalysis.

## 2018-02-01 NOTE — ED PROVIDER NOTE - CARE PLAN
Principal Discharge DX:	Subdural bleeding  Secondary Diagnosis:	Influenza A  Secondary Diagnosis:	PRANAV (acute kidney injury)

## 2018-02-01 NOTE — H&P ADULT - PMH
Alzheimer's dementia without behavioral disturbance, unspecified timing of dementia onset    Basal cell carcinoma of skin    Essential hypertension    Hyperlipidemia    Hypothyroid    Prostate cancer

## 2018-02-01 NOTE — ED PROVIDER NOTE - ENMT, MLM
Airway patent, Nasal mucosa clear. Mouth with normal mucosa. Throat has no vesicles, no oropharyngeal exudates and uvula is midline. dry mucous membranes

## 2018-02-01 NOTE — ED PROVIDER NOTE - ATTENDING CONTRIBUTION TO CARE
88 y/o male with a h/o hypothyroidism who presents as a transfer due to a SDH identified via CT and attributed to a fall he sustained earlier. His fall was described as a syncopal event while walking. His LOC was generalized, brief, without clear relieving or exacerbating factor and not a/w any additional symptoms. Beyond the SDH, he was also incidentally found to have an elevated troponin and to be + for flu. On exam, he appears thin and frail but well and comfortable. VSs noted, head non-traumatic c/ bitemporal wasting, neck supple, lungs CTA, cardiac sounds s/ audible m/r/g, chest wall s/ ttp or ecchymosis, abdomen soft, NT/ND s/ ecchymosis, extremities c/ FROM X 4 s/ hesitation or discomfort and neurologically, he is intact. Evaluated by NS here. Not certain of the SDH reported on the prior CT. We are repeating regardless. EKG s/ acute ischemic change. I suspect his troponin is not related to an actual acute cardiac event. We will trend regardless. Additional necessary diagnostics ordered. We will follow and treat/dispo accordingly.

## 2018-02-01 NOTE — ED PROVIDER NOTE - CONSTITUTIONAL, MLM
normal... cachectic, awake, alert, oriented to person, place, time/situation and in no apparent distress. cachectic, awake, alert, oriented to person, place and in no apparent distress.

## 2018-02-01 NOTE — ED PROVIDER NOTE - MEDICAL DECISION MAKING DETAILS
patient pw 2 syncopes, one of which resulted in subdural (likely the first fall last night when found on theground). There is an nstemi (type ii troponin elevation suspected). Will not anticoagulate with active subdural unless repeat troponin significantly elevated. PRANAV noted, which is likely prerenal. Will give IVF. Flu A positive, will start on tamiflu. Have spoken with Dr. Wilson of [place] [facility], Dr. Castle the hospitalist and Dr. Carvajal in the ER there - we agree to transfer patient for admission and neurocritical monitoring as needed. Patient is amenable to this and, despite his dx of dementia, understands.

## 2018-02-01 NOTE — CONSULT NOTE ADULT - SUBJECTIVE AND OBJECTIVE BOX
Subjective:   Date of Consultation:   Primary care physician:Jasmyne Soto M.D.    Reason for Consultation:  Ms. Leggett  is a pleasant 48 y.o. year old female who presents for follow-up of Chronic inflammatory arthritis, chronic tophaceous gout    Recent Hospitalization  ** In September 2017 she was hospitalized for a GI bleed and on colonsocopy noted to have AVMShe was hospitalized for septic shock and RENEE. She was C diff positive.    History of recurrent pancreatitis  This most recent episode (Oct 2016) would be her 6th episode.  Episodes of pancreatitis started in 2007 when she was drinking. No further episodes    Chronic inflammatory arthritis  She reports increase pain.     Current Medications:  Humira 40 mg po q 2 weeks  (q Wednesday) she is currently off due to refill issues  Sulfasalazine was stopped due to elevated LFT  Plaquenil 200 mg BID  She had tapered down to 15 mg po q day of prednisone  She is off methotrexate (secondary to elevated LFT) stopped in December    RHEUM HISTORY  She first presented to see Dr. Patino 9/15/2015 with right wrist and hand pain with swelling and erythema.   However she was initially diagnosed in 2004 with bursitis and 2009 with a diagnosis of lupus.  She presented to Dr. Lawler's practice and was diagnosed with seronegative Rheumatoid Arthritis.  Historically it seems she was responsive only to high dose steroids.  She was previously on Enbrel with poor response she started getting sick.  She was then placed on CImzia and had good response however insurance would not cover the medication, thus she was started on Humira.  Her care has been complicated with sepsis and elevated LFT for which MTX was initially stopped then sulfasalazine.    She does report a history of lupus and received a diagnosis in 2009.  According to notes from 10/6/2015 her manifestations include intermittent oral ulcers, pleuritic chest pain, photosensitivity.   Her history has been noted     Hand xray  9/15/2015 shows no erosions but soft tissue swelling related to OA vs inflammatory arthritis    Elevated LFT  She was followed with Dr. Boone and felt she had fatty liver.    Chronic Tophaceous gout   No gout flares since last visit.  Last uric acid was 4.9 on  7/22/2016  Off colchicine - this was stopped during her hospitalization   She has not had any problems or flares.    Current Medications  Allopurinol 300 mg po q day    Xanthoma of the right elbow  Biopsy from 7/8/2016 showed gouty tophi  Her nodules are decreasing in size    Hypertriglyceridemia  Still on gemfibrozil and pravastatin  Followed by Dr. Soto    Osteopenia  On alendronate  No fractures since last visit    Hematuria  She reports she notes blood tinge hematuria.  She has seen urology at [facility] and her evaluation felt it was 2/2 methotrexate    DDD, cervical  She notes increased pain. In the past she has been followed by neurosurgery    Vitamin D deficiency  Patient state she is taking calcium and vitamin D  25 hydroxy vitamin D in 7/2016 was 29    Chronic steroid use  She was told she had adrenal insufficiency at her October hospitalization  She is again on 15 mg po q day       Dr Jay for liver problems  Past Medical History:   Diagnosis Date   • Arthritis    • Fibroids    • Hypertension    • Lupus (CMS-HCC)    • Pancreatitis    • Psoriatic arthritis (CMS-HCC)    • SLE (systemic lupus erythematosus) (CMS-HCC)      Past Surgical History:   Procedure Laterality Date   • GASTROSCOPY-ENDO  9/17/2017    Procedure: GASTROSCOPY-ENDO;  Surgeon: Tripp Lagunas M.D.;  Location: ENDOSCOPY [facility];  Service: Gastroenterology   • COLONOSCOPY  8/8/2017    Procedure: COLONOSCOPY;  Surgeon: Abdon Boone M.D.;  Location: SURGERY [facility];  Service:    • COLONOSCOPY N/A 5/31/2017    Procedure: COLONOSCOPY;  Surgeon: Enrique Canseco M.D.;  Location: SURGERY [facility];  Service:    • GASTROSCOPY WITH BIOPSY N/A 5/31/2017    Procedure:  GASTROSCOPY WITH BIOPSY;  Surgeon: Enrique Canseco M.D.;  Location: SURGERY [facility];  Service:    • GYN SURGERY  2013    Hysteroscopy, D and C     Allergies   Allergen Reactions   • Codeine      Outpatient Encounter Prescriptions as of 2017   Medication Sig Dispense Refill   • calcium carbonate (TUMS) 500 MG Chew Tab Take 1 Tab by mouth 3 times a day, with meals for 5 days. 15 Tab 0   • folic acid (FOLVITE) 1 MG Tab Take 1 mg by mouth every day.     • predniSONE (DELTASONE) 5 MG Tab Take 15 mg by mouth every day.     • hydroxychloroquine (PLAQUENIL) 200 MG Tab Take 1 Tab by mouth 2 times a day. 60 Tab 2   • omeprazole (PRILOSEC) 20 MG delayed-release capsule Take 1 Cap by mouth every day. 30 Cap 3   • lisinopril (PRINIVIL) 20 MG Tab Take 20 mg by mouth every day.     • allopurinol (ZYLOPRIM) 300 MG Tab Take 1 Tab by mouth every day. 30 Tab 0   • ergocalciferol (DRISDOL) 85956 UNIT capsule Take 1 cap once weekly for 12 weeks. 12 Cap 0     No facility-administered encounter medications on file as of 2017.      Social History     Social History   • Marital status:      Spouse name: N/A   • Number of children: N/A   • Years of education: N/A     Occupational History   • Not on file.     Social History Main Topics   • Smoking status: Never Smoker   • Smokeless tobacco: Never Used   • Alcohol use Yes   • Drug use: No   • Sexual activity: Not on file     Other Topics Concern   • Not on file     Social History Narrative   • No narrative on file      History   Smoking Status   • Never Smoker   Smokeless Tobacco   • Never Used     History   Alcohol Use   • Yes     History   Drug Use No      OB History    Para Term  AB Living   0 0           SAB TAB Ectopic Molar Multiple Live Births                          Patient's last menstrual period was 2015.    G P A L     Family History   Problem Relation Age of Onset   • Diabetes Mother    • Hypertension Mother    • Cancer Mother    •  Diabetes Father    • Cancer Father        Review of Systems   Constitutional: Positive for malaise/fatigue. Negative for chills and fever.   HENT:        No oral ulcers or dry mouth   Eyes: Negative for double vision, photophobia and pain.   Respiratory: Negative for cough and hemoptysis.    Cardiovascular: Negative for chest pain and palpitations.   Gastrointestinal: Negative for blood in stool and constipation.   Musculoskeletal: Positive for back pain, joint pain and neck pain.   Skin:        No malar rash or photosensitive rash   Neurological: Negative for headaches.        Objective:   LMP 08/04/2015    reheck HR was 140  BP 98/62 left arm    Physical Exam   Constitutional: She is oriented to person, place, and time. She appears well-developed and well-nourished. No distress.   HENT:   Head: Normocephalic.   Right Ear: External ear normal.   Left Ear: External ear normal.   Mouth/Throat: Oropharynx is clear and moist.   Eyes: Conjunctivae and EOM are normal. Pupils are equal, round, and reactive to light. Right eye exhibits no discharge. Left eye exhibits no discharge.   Neck: No JVD present.   Pulmonary/Chest: Effort normal and breath sounds normal. No stridor. No respiratory distress.   Musculoskeletal: She exhibits no edema.   Gout tophi on the right elbow.  Mild ulnar deviation but not periarticular swelling in the MCP or PIP.  No swelling of the wrists.   Neurological: She is alert and oriented to person, place, and time.   Skin: Skin is warm. She is not diaphoretic. No erythema.   Still a small nodule on the left and a bursa on the right.   Psychiatric: She has a normal mood and affect. Her behavior is normal. Thought content normal.       Assessment:     No diagnosis found.  Labs:    Lab Results   Component Value Date/Time    QNTTBGOLD Negative 12/30/2015 12:27 PM     Lab Results   Component Value Date/Time    HEPBCORTOT Negative 10/21/2015 08:47 AM    HEPBSAG Negative 10/21/2015 08:47 AM     Lab Results    Component Value Date/Time    HEPCAB Negative 10/21/2015 08:47 AM     Lab Results   Component Value Date/Time    SODIUM 136 09/18/2017 05:03 AM    POTASSIUM 4.9 09/18/2017 05:03 AM    CHLORIDE 110 09/18/2017 05:03 AM    CO2 19 (L) 09/18/2017 05:03 AM    GLUCOSE 99 09/18/2017 05:03 AM    BUN 7 (L) 09/18/2017 05:03 AM    CREATININE 0.78 09/18/2017 05:03 AM      Lab Results   Component Value Date/Time    WBC 5.4 09/18/2017 05:03 AM    RBC 2.45 (L) 09/18/2017 05:03 AM    HEMOGLOBIN 7.3 (L) 09/18/2017 05:03 AM    HEMATOCRIT 23.1 (L) 09/18/2017 05:03 AM    MCV 94.3 09/18/2017 05:03 AM    MCH 29.8 09/18/2017 05:03 AM    MCHC 31.6 (L) 09/18/2017 05:03 AM    MPV 9.6 09/18/2017 05:03 AM    NEUTSPOLYS 67.60 09/18/2017 05:03 AM    LYMPHOCYTES 17.30 (L) 09/18/2017 05:03 AM    MONOCYTES 12.40 09/18/2017 05:03 AM    EOSINOPHILS 0.70 09/18/2017 05:03 AM    BASOPHILS 0.90 09/18/2017 05:03 AM    HYPOCHROMIA 1+ 01/22/2014 02:20 AM    ANISOCYTOSIS 1+ 08/09/2017 03:22 AM      Lab Results   Component Value Date/Time    CALCIUM 6.9 (LL) 09/18/2017 05:03 AM    ASTSGOT 74 (H) 09/18/2017 05:03 AM    ALTSGPT 14 09/18/2017 05:03 AM    ALKPHOSPHAT 187 (H) 09/18/2017 05:03 AM    TBILIRUBIN 1.9 (H) 09/18/2017 05:03 AM    ALBUMIN 2.8 (L) 09/18/2017 05:03 AM    TOTPROTEIN 5.2 (L) 09/18/2017 05:03 AM     Lab Results   Component Value Date/Time    URICACID 4.5 11/30/2016 11:25 AM    RHEUMFACTN <10 07/22/2016 04:41 PM    CCPANTIBODY 2 06/22/2015 11:24 AM    ANTINUCAB None Detected 07/22/2016 04:41 PM     Lab Results   Component Value Date/Time    SEDRATEWES 39 (H) 08/04/2017 12:08 PM    CREACTPROT 0.42 08/03/2017 09:55 AM     Lab Results   Component Value Date/Time    RUSSELVIPER 46.8 02/10/2016 04:27 PM    DRVVTINTERP Not Present 02/10/2016 04:27 PM     Lab Results   Component Value Date/Time    N2IFFHKPZPM 172.0 08/03/2017 09:55 AM    H7SZCCZORMT 38.0 08/03/2017 09:55 AM    IGGCARDIOLI 1 02/10/2016 04:27 PM    IGMCARDIOLI 0 02/10/2016 04:27 PM     p (1480)     HPI: 88yo male transfer from [facility] s/p syncope. Pt. was found on floor by [facility]HA this AM and when walking patient syncopized again caught by family, no trauma. Found to have SDH, flu. Not on any A/C. Pt. with cough, for 2 days.  Positive influenza.    PAST MEDICAL HISTORY   Hyperlipidemia  Basal cell carcinoma of skin  Essential hypertension  Hypothyroid    PAST SURGICAL HISTORY   No significant past surgical history        MEDICATIONS:  Antibiotics:    Neuro:    Anticoagulation:    Other:      SOCIAL HISTORY:   Occupation:   Marital Status:     FAMILY HISTORY:  Family history of essential hypertension  No pertinent family history in first degree relatives      REVIEW OF SYSTEMS:  Check here if all are normal other than Neurological [x]  General:  Eyes:  ENT:  Cardiac:  Respiratory:  GI:  Musculoskeletal:   Skin:  Neurologic:   Psychiatric:     PHYSICAL EXAMINATION:   T(C): 36.7 (18 @ 12:30), Max: 37.1 (18 @ 05:30)  HR: 86 (18 @ 12:55) (56 - 90)  BP: 144/78 (18 @ 12:55) (118/39 - 158/83)  RR: 18 (18 @ 12:55) (16 - 20)  SpO2: 98% (18 @ 12:55) (97% - 98%)  Wt(kg): --Height (cm): 182.88 ( @ 05:30)  Weight (kg): 72.6 ( @ 05:30)    General Examination:     Neurologic Examination:           AOx3, FC, PERRL, EOMI, V1-3 intact, no facial, palate marcos symmetric, tongue midline, shrug 5/5  4-/5 LUE (chronic), 5/5 throughout, no drift  SILT  No clonus or babinski      LABS:                        10.5   11.04 )-----------( 324      ( 2018 07:22 )             32.0         138  |  107  |  30<H>  ----------------------------<  96  4.7   |  25  |  1.47<H>    Ca    8.0<L>      2018 07:22    TPro  7.0  /  Alb  3.1<L>  /  TBili  0.3  /  DBili  x   /  AST  29  /  ALT  18  /  AlkPhos  90  -    PT/INR - ( 2018 07:22 )   PT: 11.6 sec;   INR: 1.06 ratio         PTT - ( 2018 07:22 )  PTT:28.2 sec  Urinalysis Basic - ( 2018 12:32 )    Color: Yellow / Appearance: very cloudy / S.010 / pH: x  Gluc: x / Ketone: Negative  / Bili: Negative / Urobili: Negative mg/dL   Blood: x / Protein: 100 mg/dL / Nitrite: Negative   Leuk Esterase: Moderate / RBC: 6-10 /HPF / WBC 6-10   Sq Epi: x / Non Sq Epi: Few / Bacteria: Moderate        RADIOLOGY & ADDITIONAL STUDIES: IGACARDIOLI 1 02/10/2016 04:27 PM     Lab Results   Component Value Date/Time    ANTIDNADS None Detected 08/03/2017 09:55 AM     Lab Results   Component Value Date/Time    ANTIDNADS None Detected 08/03/2017 09:55 AM    ANCAIGG <1:20 02/10/2016 04:27 PM    F3KKWQCSXUC 172.0 08/03/2017 09:55 AM     Lab Results   Component Value Date/Time    COLORURINE DK Yellow 09/16/2017 01:23 PM    SPECGRAVITY 1.009 09/16/2017 01:23 PM    PHURINE 5.0 09/16/2017 01:23 PM    GLUCOSEUR Negative 09/16/2017 01:23 PM    KETONES Negative 09/16/2017 01:23 PM    PROTEINURIN Negative 09/16/2017 01:23 PM     No results found for: TOTPROTUR, TOTALVOLUME, [id number]  No results found for: SSA60, SSA52  Lab Results   Component Value Date/Time    HBA1C 6.5 (H) 09/21/2015 09:34 AM     Lab Results   Component Value Date/Time    CPKTOTAL 145 05/27/2017 09:49 AM     Lab Results   Component Value Date/Time    G6PD 11.0 12/14/2015 12:40 PM     No results found for: [id number]  No results found for: ACESERUM  Lab Results   Component Value Date/Time    25HYDROXY 34 09/16/2017 11:54 PM     No results found for: TSH, FREEDIR  Lab Results   Component Value Date/Time    TSHULTRASEN 2.650 08/07/2017 01:46 AM    FREET4 0.99 09/21/2015 09:34 AM     No results found for: MICROSOMALA, ANTITHYROGL  No results found for: IGGLYMEABS  Lab Results   Component Value Date/Time    FACTIN 6 06/03/2017 08:05 AM     Lab Results   Component Value Date/Time     (H) 08/03/2017 09:55 AM    TTRANSIGA 1 08/03/2017 09:55 AM     Lab Results   Component Value Date/Time    FLTYPE comment 09/15/2015 02:59 PM    CRYSTALSBDF Many 09/15/2015 02:59 PM     No results found for: ISTATICAL  No results found for: ISTATCREAT  No results found for: CTELOPEP  No results found for: GBMABG  Lab Results   Component Value Date/Time    PTHINTACT 167.1 (H) 09/16/2017 11:54 PM           Medical Decision Making:  Today's Assessment / Status / Plan:   Tachycardia  Note that she was found to be  tachycardic as high as 140 bpm. Her blood pressure was 98/62 on the left arm. Patient is asymptomatic. However for tachycardia and asked her to return back to the emergency room for further evaluation. I did note that she was anemic upon discharge however her vitals at discharge showed that her heart rate was 80 and blood pressure was in the 150s for systolic and 64 for diastolic blood pressure.    Chronic inflammatory arthritis  She is currently on prednisone 15 mg and Humira.  She has been off Humira for a few weeks.  She is not on sulfasalazine.  She is not on methotrexate.  I would first like her to restart the Humira.  I did advise the patient not to restart her medications of she is ill.    History of Lupus  No active signs or symptoms.  Will recheck complement and anti dsDNA and evaluate for cytopenias at next appointment  Continue plaquenil 200 mg BID    Elevated LFT and hepatomegaly on exam  She has a follow-up GI appointment  She also had an abnormal ultrasound    History of Hematuria  Etiology was attributed to methotrexate use.  Will recheck at next visit.    Osteopenia and vitamin D deficiency  Continue fosamax   Continue vitamin D and calcium supplement    Chronic tophaceous gout  Continue allopurinol   Uric acid  11/30/2016 was 4.5 - doing well goal is 6.0       No diagnosis found.        No Follow-up on file.     I have spent greater than 50% of this 30 minute visit in counseling/coordination of care with the patient regarding  Her tachycardia and contacting the ED as well as discussing the next steps.      She agreed and verbalized her agreement and understanding with the current plan. I answered all questions and concerns she has at this time and advised her to call at any time in there interim with questions or concerns in regards to her care.    Thank you for allowing me to participate in her care, I will continue to follow closely.

## 2018-02-01 NOTE — ED ADULT NURSE REASSESSMENT NOTE - NS ED NURSE REASSESS COMMENT FT1
Pt A&Ox3, no change in baseline. Pt cleaned, changed, repositioned. Barrier cream put on patient's back and genital area for preventative. VSS, will continue to reassess.

## 2018-02-02 LAB
ALBUMIN SERPL ELPH-MCNC: 3.1 G/DL — LOW (ref 3.3–5)
ALBUMIN SERPL ELPH-MCNC: 3.4 G/DL — SIGNIFICANT CHANGE UP (ref 3.3–5)
ALP SERPL-CCNC: 73 U/L — SIGNIFICANT CHANGE UP (ref 40–120)
ALP SERPL-CCNC: 73 U/L — SIGNIFICANT CHANGE UP (ref 40–120)
ALT FLD-CCNC: 11 U/L — SIGNIFICANT CHANGE UP (ref 10–45)
ALT FLD-CCNC: 9 U/L RC — LOW (ref 10–45)
ANION GAP SERPL CALC-SCNC: 12 MMOL/L — SIGNIFICANT CHANGE UP (ref 5–17)
ANION GAP SERPL CALC-SCNC: 13 MMOL/L — SIGNIFICANT CHANGE UP (ref 5–17)
AST SERPL-CCNC: 21 U/L — SIGNIFICANT CHANGE UP (ref 10–40)
AST SERPL-CCNC: 24 U/L — SIGNIFICANT CHANGE UP (ref 10–40)
BASOPHILS # BLD AUTO: 0 K/UL — SIGNIFICANT CHANGE UP (ref 0–0.2)
BASOPHILS NFR BLD AUTO: 0 % — SIGNIFICANT CHANGE UP (ref 0–2)
BILIRUB SERPL-MCNC: 0.2 MG/DL — SIGNIFICANT CHANGE UP (ref 0.2–1.2)
BILIRUB SERPL-MCNC: 0.2 MG/DL — SIGNIFICANT CHANGE UP (ref 0.2–1.2)
BUN SERPL-MCNC: 29 MG/DL — HIGH (ref 7–23)
BUN SERPL-MCNC: 31 MG/DL — HIGH (ref 7–23)
CALCIUM SERPL-MCNC: 8.4 MG/DL — SIGNIFICANT CHANGE UP (ref 8.4–10.5)
CALCIUM SERPL-MCNC: 8.5 MG/DL — SIGNIFICANT CHANGE UP (ref 8.4–10.5)
CHLORIDE SERPL-SCNC: 104 MMOL/L — SIGNIFICANT CHANGE UP (ref 96–108)
CHLORIDE SERPL-SCNC: 105 MMOL/L — SIGNIFICANT CHANGE UP (ref 96–108)
CO2 SERPL-SCNC: 21 MMOL/L — LOW (ref 22–31)
CO2 SERPL-SCNC: 21 MMOL/L — LOW (ref 22–31)
CREAT SERPL-MCNC: 1.22 MG/DL — SIGNIFICANT CHANGE UP (ref 0.5–1.3)
CREAT SERPL-MCNC: 1.41 MG/DL — HIGH (ref 0.5–1.3)
EOSINOPHIL # BLD AUTO: 0 K/UL — SIGNIFICANT CHANGE UP (ref 0–0.5)
EOSINOPHIL NFR BLD AUTO: 0 % — SIGNIFICANT CHANGE UP (ref 0–6)
GLUCOSE SERPL-MCNC: 107 MG/DL — HIGH (ref 70–99)
GLUCOSE SERPL-MCNC: 107 MG/DL — HIGH (ref 70–99)
HCT VFR BLD CALC: 29.4 % — LOW (ref 39–50)
HGB BLD-MCNC: 9.2 G/DL — LOW (ref 13–17)
LYMPHOCYTES # BLD AUTO: 6.83 K/UL — HIGH (ref 1–3.3)
LYMPHOCYTES # BLD AUTO: 70 % — HIGH (ref 13–44)
MAGNESIUM SERPL-MCNC: 2.1 MG/DL — SIGNIFICANT CHANGE UP (ref 1.6–2.6)
MAGNESIUM SERPL-MCNC: 2.1 MG/DL — SIGNIFICANT CHANGE UP (ref 1.6–2.6)
MCHC RBC-ENTMCNC: 28.9 PG — SIGNIFICANT CHANGE UP (ref 27–34)
MCHC RBC-ENTMCNC: 31.3 GM/DL — LOW (ref 32–36)
MCV RBC AUTO: 92.5 FL — SIGNIFICANT CHANGE UP (ref 80–100)
MONOCYTES # BLD AUTO: 0.68 K/UL — SIGNIFICANT CHANGE UP (ref 0–0.9)
MONOCYTES NFR BLD AUTO: 7 % — SIGNIFICANT CHANGE UP (ref 2–14)
NEUTROPHILS # BLD AUTO: 2.24 K/UL — SIGNIFICANT CHANGE UP (ref 1.8–7.4)
NEUTROPHILS NFR BLD AUTO: 23 % — LOW (ref 43–77)
PHOSPHATE SERPL-MCNC: 3.2 MG/DL — SIGNIFICANT CHANGE UP (ref 2.5–4.5)
PHOSPHATE SERPL-MCNC: 3.3 MG/DL — SIGNIFICANT CHANGE UP (ref 2.5–4.5)
PLATELET # BLD AUTO: 269 K/UL — SIGNIFICANT CHANGE UP (ref 150–400)
POTASSIUM SERPL-MCNC: 4.6 MMOL/L — SIGNIFICANT CHANGE UP (ref 3.5–5.3)
POTASSIUM SERPL-MCNC: 4.8 MMOL/L — SIGNIFICANT CHANGE UP (ref 3.5–5.3)
POTASSIUM SERPL-SCNC: 4.6 MMOL/L — SIGNIFICANT CHANGE UP (ref 3.5–5.3)
POTASSIUM SERPL-SCNC: 4.8 MMOL/L — SIGNIFICANT CHANGE UP (ref 3.5–5.3)
PROT SERPL-MCNC: 6.1 G/DL — SIGNIFICANT CHANGE UP (ref 6–8.3)
PROT SERPL-MCNC: 6.2 G/DL — SIGNIFICANT CHANGE UP (ref 6–8.3)
RBC # BLD: 3.18 M/UL — LOW (ref 4.2–5.8)
RBC # FLD: 14.5 % — SIGNIFICANT CHANGE UP (ref 10.3–14.5)
SODIUM SERPL-SCNC: 138 MMOL/L — SIGNIFICANT CHANGE UP (ref 135–145)
SODIUM SERPL-SCNC: 138 MMOL/L — SIGNIFICANT CHANGE UP (ref 135–145)
TROPONIN T SERPL-MCNC: 0.11 NG/ML — HIGH (ref 0–0.06)
WBC # BLD: 9.75 K/UL — SIGNIFICANT CHANGE UP (ref 3.8–10.5)
WBC # FLD AUTO: 9.75 K/UL — SIGNIFICANT CHANGE UP (ref 3.8–10.5)

## 2018-02-02 PROCEDURE — 99231 SBSQ HOSP IP/OBS SF/LOW 25: CPT

## 2018-02-02 RX ORDER — HYDROCHLOROTHIAZIDE 25 MG
0 TABLET ORAL
Qty: 0 | Refills: 0 | COMMUNITY

## 2018-02-02 RX ORDER — POTASSIUM CHLORIDE 20 MEQ
0 PACKET (EA) ORAL
Qty: 0 | Refills: 0 | COMMUNITY

## 2018-02-02 RX ORDER — LEVOTHYROXINE SODIUM 125 MCG
1 TABLET ORAL
Qty: 0 | Refills: 0 | COMMUNITY

## 2018-02-02 RX ORDER — LEVOTHYROXINE SODIUM 125 MCG
125 TABLET ORAL DAILY
Qty: 0 | Refills: 0 | Status: DISCONTINUED | OUTPATIENT
Start: 2018-02-02 | End: 2018-02-09

## 2018-02-02 RX ADMIN — Medication 30 MILLIGRAM(S): at 11:08

## 2018-02-02 RX ADMIN — LATANOPROST 1 DROP(S): 0.05 SOLUTION/ DROPS OPHTHALMIC; TOPICAL at 21:33

## 2018-02-02 RX ADMIN — Medication 3 MILLILITER(S): at 21:33

## 2018-02-02 RX ADMIN — Medication 100 MICROGRAM(S): at 05:31

## 2018-02-02 RX ADMIN — Medication 3 MILLILITER(S): at 17:53

## 2018-02-02 RX ADMIN — ATORVASTATIN CALCIUM 10 MILLIGRAM(S): 80 TABLET, FILM COATED ORAL at 21:32

## 2018-02-02 RX ADMIN — Medication 3 MILLILITER(S): at 09:18

## 2018-02-02 RX ADMIN — TAMSULOSIN HYDROCHLORIDE 0.4 MILLIGRAM(S): 0.4 CAPSULE ORAL at 21:32

## 2018-02-02 RX ADMIN — CEFTRIAXONE 100 GRAM(S): 500 INJECTION, POWDER, FOR SOLUTION INTRAMUSCULAR; INTRAVENOUS at 19:55

## 2018-02-02 RX ADMIN — Medication 3 MILLILITER(S): at 02:22

## 2018-02-02 NOTE — PROGRESS NOTE ADULT - SUBJECTIVE AND OBJECTIVE BOX
[facility]  _________________________    CC: Syncope    HPI:  89 M h/o dementia, HLD, HTN, melanoma in remission, prostate Ca in remission, hypothyroidism, CKD who was transferred from [facility] after 2 episodes of syncope yesterday and this AM.  Yesterday patient was found down on floor by home aide, then this AM when daughter got him out of bed to assist him to bathroom he lost consciousness again but she caught him before he could hit the floor. No trauma is reported.  + recent cough, weakness, chest congestions  x 2 days.  CT of the head at [place] showed acute R frontal SDH and he was transferred here for neurosurgical eval. The patient does not recall the events well.     PAST MEDICAL & SURGICAL HISTORY:  Prostate cancer  Alzheimer's dementia without behavioral disturbance, unspecified timing of dementia onset  Hyperlipidemia  Basal cell carcinoma of skin  Essential hypertension  Hypothyroid  No significant past surgical history      MEDICATIONS  (STANDING):  ALBUTerol/ipratropium for Nebulization 3 milliLiter(s) Nebulizer every 6 hours  atorvastatin 10 milliGRAM(s) Oral at bedtime  cefTRIAXone   IVPB      cefTRIAXone   IVPB 1 Gram(s) IV Intermittent every 24 hours  latanoprost 0.005% Ophthalmic Solution 1 Drop(s) Both EYES at bedtime  levothyroxine 100 MICROGram(s) Oral daily  oseltamivir 30 milliGRAM(s) Oral daily  tamsulosin 0.4 milliGRAM(s) Oral at bedtime    MEDICATIONS  (PRN):  acetaminophen   Tablet 650 milliGRAM(s) Oral every 6 hours PRN For Temp greater than 38 C (100.4 F)  guaiFENesin   Syrup  (Sugar-Free) 100 milliGRAM(s) Oral every 6 hours PRN Cough  ondansetron Injectable 4 milliGRAM(s) IV Push every 6 hours PRN Nausea      Allergies    No Known Allergies    Intolerances        Social Histroy: Tobacco- , ETOH-, Illicit Drugs-    T(C): 37.3 (18 @ 07:42), Max: 37.9 (18 @ 14:05)  HR: 71 (18 @ 07:42) (62 - 90)  BP: 131/60 (18 @ 07:42) (113/54 - 158/83)  RR: 19 (18 @ 07:42) (16 - 25)  SpO2: 97% (18 @ 07:42) (96% - 100%)  I&O's Summary      Review of Systems:  Constitutional: [ ] Fever [ ] Chills [ ] Fatigue [ ] Weight change   HEENT: [ ] Blurred vision [ ] Eye Pain [ ] Headache [ ] Runny nose [ ] Sore Throat   Respiratory: [ ] Cough [ ] Wheezing [ ] Shortness of breath  Cardiovascular: [ ] Chest Pain [ ] Palpitations [ ] HUGGINS [ ] PND [ ] Orthopnea  Gastrointestinal: [ ] Abdominal Pain [ ] Diarrhea [ ] Constipation [ ] Hemorrhoids [ ] Nausea [ ] Vomiting  Genitourinary: [ ] Nocturia [ ] Dysuria [ ] Incontinence  Extremities: [ ] Swelling [ ] Joint Pain  Neurologic: [ ] Focal deficit [ ] Paresthesias [ x] Syncope  Lymphatic: [ ] Swelling [ ] Lymphadenopathy   Skin: [ ] Rash [ ] Ecchymoses [ ] Wounds [ ] Lesions  Psychiatry: [ ] Depression [ ] Suicidal/Homicidal Ideation [ ] Anxiety [ ] Sleep Disturbances  [x ] 10 point review of systems is otherwise negative except as mentioned above            [ ]Unable to obtain    PHYSICAL EXAM:  GENERAL: Alert, NAD  NECK: Supple, No JVD, No carotid bruit.  CHEST/LUNG: scattered crackles.  HEART: S1 S2 normal, RRR,  No murmurs, rubs, or gallops  ABDOMEN: Soft, Nontender, Nondistended; Bowel sounds present  EXTREMITIES:  No LE edema.      LABS:                        9.2    9.75  )-----------( 269      ( 2018 07:18 )             29.4     02-    138  |  104  |  29<H>  ----------------------------<  107<H>  4.6   |  21<L>  |  1.41<H>    Ca    8.5      2018 02:59  Phos  3.2     02-  Mg     2.1     -    TPro  6.2  /  Alb  3.4  /  TBili  0.2  /  DBili  x   /  AST  21  /  ALT  9<L>  /  AlkPhos  73  02-    PT/INR - ( 2018 07:22 )   PT: 11.6 sec;   INR: 1.06 ratio         PTT - ( 2018 07:22 )  PTT:28.2 sec  CARDIAC MARKERS ( 2018 02:59 )  x     / 0.11 ng/mL / x     / x     / x      CARDIAC MARKERS ( 2018 21:51 )  x     / 0.13 ng/mL / 222 U/L / x     / 4.2 ng/mL  CARDIAC MARKERS ( 2018 14:55 )  x     / 0.10 ng/mL / 257 U/L / x     / 4.0 ng/mL  CARDIAC MARKERS ( 2018 07:22 )  .372 ng/mL / x     / 292 U/L / x     / 2.1 ng/mL          Urinalysis Basic - ( 2018 12:32 )    Color: Yellow / Appearance: very cloudy / S.010 / pH: x  Gluc: x / Ketone: Negative  / Bili: Negative / Urobili: Negative mg/dL   Blood: x / Protein: 100 mg/dL / Nitrite: Negative   Leuk Esterase: Moderate / RBC: 6-10 /HPF / WBC 6-10   Sq Epi: x / Non Sq Epi: Few / Bacteria: Moderate        MEDICATIONS  (STANDING):  ALBUTerol/ipratropium for Nebulization 3 milliLiter(s) Nebulizer every 6 hours  atorvastatin 10 milliGRAM(s) Oral at bedtime  cefTRIAXone   IVPB      cefTRIAXone   IVPB 1 Gram(s) IV Intermittent every 24 hours  latanoprost 0.005% Ophthalmic Solution 1 Drop(s) Both EYES at bedtime  levothyroxine 100 MICROGram(s) Oral daily  oseltamivir 30 milliGRAM(s) Oral daily  tamsulosin 0.4 milliGRAM(s) Oral at bedtime    MEDICATIONS  (PRN):  acetaminophen   Tablet 650 milliGRAM(s) Oral every 6 hours PRN For Temp greater than 38 C (100.4 F)  guaiFENesin   Syrup  (Sugar-Free) 100 milliGRAM(s) Oral every 6 hours PRN Cough  ondansetron Injectable 4 milliGRAM(s) IV Push every 6 hours PRN Nausea      RADIOLOGY & ADDITIONAL TESTS:    Cardiology testing:  EKG: sinus bradycardia, NSST, QTc 459 ms.    Echo:    < from: Transthoracic Echocardiogram w/ Doppler (09 @ 17:23) >  PATIENT: BRANKER, EDWARD  : 1928   Age: 80 (M)   MR#: [id number]  STUDY DATE: 2009  LOCATION: O/P     Tape: Digital  SONOGRAPHER: Mathew Kelley [facility]  STUDY QUALITY: Technically good  REF. PHYSICIAN(S): Ian George M.D.  ------------------------------------------------------------------------  Procedure: Transthoracic echocardiogram with complete 2-D,  M-Mode and Doppler examination.  Diagnosis: Syncope  ------------------------------------------------------------------------  Dimensions:    Normal Values:  LA:     4.3    2.0 - 4.0 cm  Ao:     3.8    2.0 - 3.8 cm  SEPTUM: 0.7    0.6 - 1.2 cm  PWT:    1.0    0.6 - 1.1 cm  LVIDd:  4.5    3.0 - 5.6 cm  LVIDs:  2.7    1.8 - 4.0 cm  Derived variables:  LVMI: 64 g/m2  RWT: 0.44  Fractional short: 40 %  Ejection Fraction: 71 %  ------------------------------------------------------------------------  Observations:  Mitral Valve: Mitral annular calcification, otherwise  normal mitral valve.  Aortic Valve/Aorta: Calcified trileaflet aortic valve with  normal opening.  Normal aortic root.  Left Atrium: Normal left atrium.  Left Ventricle: Normal left ventricular internal dimensions  and wall thicknesses. There is upper septal hypertrophy  without obstruction.  Normal left ventricular systolic function. No Segmental  wall motion abnormalities EF = 71%  Right Heart: Normal right atrium.  Normal right ventricular size and systolic function. TAPSE  = 24mm  Normal tricuspid and pulmonic valves.  Pericardium/Pleura: Normalpericardium with no pericardial  effusion.  Doppler:Minimal mitral regurgitation.  No  aortic valve regurgitation seen.  Minimal tricuspid regurgitation. Estimated pulmonary artery  systolic pressure equals 21 mm Hg, assuming right atrial  pressure equals 10  mm Hg.  Minimal pulmonic regurgitation.  ------------------------------------------------------------------------  Conclusions:  1. Minimal mitral regurgitation.  2. No  aortic valve regurgitation seen.  3. Normal left ventricular systolic function. No Segmental  wall motion abnormalities EF = 71%  4. Normal right ventricular size and systolic function.  TAPSE = 24mm  5. Minimal tricuspid regurgitation.  ------------------------------------------------------------------------  Confirmed on  4/3/2009 - 20:51:43 by Maddi Lowe M.D.  ------------------------------------------------------------------------    < end of copied text >  Stress Testing:

## 2018-02-02 NOTE — CONSULT NOTE ADULT - ASSESSMENT
Syncope  Influenza A viral infection, sepsis  Dementia  Subdural hemetoma    REC    Check procalcitonin: doubt need for abx for pneumonia  Tamiflu X 5 days  No need for bronchodilators at this time  NS f/u

## 2018-02-02 NOTE — PROGRESS NOTE ADULT - PROBLEM SELECTOR PLAN 2
-  - Admit to tele floor.  - check echo.    will follow.    Braulio Mireles M.D., [facility]  [phone]     A total of 80 minutes of face-to-face time was spent with the patient during this encounter -over half of that time was spent in counseling and coordination of care.

## 2018-02-02 NOTE — CONSULT NOTE ADULT - SUBJECTIVE AND OBJECTIVE BOX
PULMONARY CONSULT  Oz Becerra MD  [phone]    Initial HPI on admission:  HPI:  Medical History is taken from chart review from record from [facility] ER and from record from [facility] hospital        88yo  patient withPMH of dementia, HLD, HTN, melanoma in remission, prostate Ca in remission, hypothyroidism was brought to [facility] on 18 due  for syncopal episodes ( X2)  yesterday and this AM.  Yesterday patient was found down on floor by HHA , then this am when daughter got him out of bed to assist him to bathroom patient lost consciousness again but she prevented him from hitting floor. No trauma is reported.   It was reported that patient patient has been having cough, weakness, chest congestions for the last 2 days.  At [facility], patient had CT of the head which was done and revealed : Thin acute right frontal Subdural hematoma without mass effect and patient was transferred to [facility] [place] for further evaluation by the Neurosurgery team.  Patient was seen by the neurosurgery team and No acute neurosurgical intervention planned .       In the ED  :  Tm= 100.2F  HR = 63-86  BP= 129-144/49-78  SPO2= % RA  RR = 20-25  WBC = 11.0  Cr= 1.47 ( cr was up to 2.43 on 2017  Trop T= 0.10  U/A Abnormal  CXR : clear lungs   RVP pos influenza A  Patient was then admitted to medicine services   Patient denies any current complaints when seen (2018 20:00)      BRIEF HOSPITAL COURSE: ***    PAST MEDICAL & SURGICAL HISTORY:  Prostate cancer  Alzheimer's dementia without behavioral disturbance, unspecified timing of dementia onset  Hyperlipidemia  Basal cell carcinoma of skin  Essential hypertension  Hypothyroid  No significant past surgical history    Allergies    No Known Allergies    Intolerances      FAMILY HISTORY:  Family history of essential hypertension (Father)    Social history:     {[id number]} Review of Systems:  · Additional ROS  Patient is pleasant but is unable to give a reliable ROM and medical history        Medications:  MEDICATIONS  (STANDING):  ALBUTerol/ipratropium for Nebulization 3 milliLiter(s) Nebulizer every 6 hours  atorvastatin 10 milliGRAM(s) Oral at bedtime  cefTRIAXone   IVPB      cefTRIAXone   IVPB 1 Gram(s) IV Intermittent every 24 hours  latanoprost 0.005% Ophthalmic Solution 1 Drop(s) Both EYES at bedtime  levothyroxine 125 MICROGram(s) Oral daily  oseltamivir 30 milliGRAM(s) Oral daily  tamsulosin 0.4 milliGRAM(s) Oral at bedtime    MEDICATIONS  (PRN):  acetaminophen   Tablet 650 milliGRAM(s) Oral every 6 hours PRN For Temp greater than 38 C (100.4 F)  guaiFENesin   Syrup  (Sugar-Free) 100 milliGRAM(s) Oral every 6 hours PRN Cough  ondansetron Injectable 4 milliGRAM(s) IV Push every 6 hours PRN Nausea    Vital Signs Last 24 Hrs  T(C): 37.2 (2018 15:37), Max: 37.3 (2018 05:32)  T(F): 99 (2018 15:37), Max: 99.1 (2018 05:32)  HR: 72 (2018 15:37) (62 - 75)  BP: 115/51 (2018 15:37) (113/54 - 134/52)  BP(mean): --  RR: 18 (2018 15:37) (18 - 20)  SpO2: 100% (2018 15:37) (96% - 100%)      LABS:                        9.2    9.75  )-----------( 269      ( 2018 07:18 )             29.4     02-02    138  |  105  |  31<H>  ----------------------------<  107<H>  4.8   |  21<L>  |  1.22    Ca    8.4      2018 08:29  Phos  3.3     02-02  Mg     2.1     -02    TPro  6.1  /  Alb  3.1<L>  /  TBili  0.2  /  DBili  x   /  AST  24  /  ALT  11  /  AlkPhos  73  02-02      PT/INR - ( 2018 07:22 )   PT: 11.6 sec;   INR: 1.06 ratio         PTT - ( 2018 07:22 )  PTT:28.2 sec  Urinalysis Basic - ( 2018 12:32 )    Color: Yellow / Appearance: very cloudy / S.010 / pH: x  Gluc: x / Ketone: Negative  / Bili: Negative / Urobili: Negative mg/dL   Blood: x / Protein: 100 mg/dL / Nitrite: Negative   Leuk Esterase: Moderate / RBC: 6-10 /HPF / WBC 6-10   Sq Epi: x / Non Sq Epi: Few / Bacteria: Moderate            CULTURES:  Culture Results:   >100,000 CFU/ml Enterobacter cloacae/asburiae ( @ 15:03)  Culture Results:   No growth to date. ( @ 12:33)  Culture Results:   No growth to date. ( @ 12:33)        Physical Examination:    General: Awake, responsive, non toxic No acute distress.  O2 sat 99% on RA    HEENT: Pupils equal, reactive to light.  Symmetric.    PULM: Withough wheeze or rhonchi    CVS: Regular rate and rhythm, no murmurs, rubs, or gallops    ABD: Soft, nondistended, nontender, normoactive bowel sounds, no masses    EXT: No edema, nontender    SKIN: Warm and well perfused, no rashes noted.    NEURO: Alert, oriented, interactive, nonfocal    RADIOLOGY REVIEWED PERSONALLY  CXR: AP portable: SHUBHAM    CT chest:    TTE:

## 2018-02-02 NOTE — PROGRESS NOTE ADULT - SUBJECTIVE AND OBJECTIVE BOX
Pt alert awake, conversant.  GUERRERO well.  Repeated CT yesterday had shown no change in right subdural hematoma, chronic.  Plan for follow up in my office in approx 2 weeks for reimaging.  Discussed with daughter.  Katie [phone]

## 2018-02-02 NOTE — PROGRESS NOTE ADULT - SUBJECTIVE AND OBJECTIVE BOX
Patient is a 89y old  Male who presents with a chief complaint of Syncope and subdural hemorrhage from [facility] (2018 20:00)      SUBJECTIVE / OVERNIGHT EVENTS:    no events overnight pt in ED notes some sob but state it has improved.  not state sob worse w./ exertion alleviated by rest.  never similar symptoms assocated w/ weakness and malaise      Vital Signs Last 24 Hrs  T(C): 37.3 (2018 07:42), Max: 37.9 (2018 14:05)  T(F): 99.1 (2018 07:42), Max: 100.2 (2018 14:05)  HR: 71 (2018 07:42) (62 - 90)  BP: 131/60 (2018 07:42) (113/54 - 158/83)  BP(mean): --  RR: 19 (2018 07:42) (16 - 25)  SpO2: 97% (2018 07:42) (96% - 100%)  I&O's Summary          LABS:                        9.2    9.75  )-----------( 269      ( 2018 07:18 )             29.4     02-02    138  |  105  |  31<H>  ----------------------------<  107<H>  4.8   |  21<L>  |  1.22    Ca    8.4      2018 08:29  Phos  3.3     02-02  Mg     2.1     02-02    TPro  6.1  /  Alb  3.1<L>  /  TBili  0.2  /  DBili  x   /  AST  24  /  ALT  11  /  AlkPhos  73  02-02    PT/INR - ( 2018 07:22 )   PT: 11.6 sec;   INR: 1.06 ratio         PTT - ( 2018 07:22 )  PTT:28.2 sec  CAPILLARY BLOOD GLUCOSE        CARDIAC MARKERS ( 2018 02:59 )  x     / 0.11 ng/mL / x     / x     / x      CARDIAC MARKERS ( 2018 21:51 )  x     / 0.13 ng/mL / 222 U/L / x     / 4.2 ng/mL  CARDIAC MARKERS ( 2018 14:55 )  x     / 0.10 ng/mL / 257 U/L / x     / 4.0 ng/mL  CARDIAC MARKERS ( 2018 07:22 )  .372 ng/mL / x     / 292 U/L / x     / 2.1 ng/mL      Urinalysis Basic - ( 2018 12:32 )    Color: Yellow / Appearance: very cloudy / S.010 / pH: x  Gluc: x / Ketone: Negative  / Bili: Negative / Urobili: Negative mg/dL   Blood: x / Protein: 100 mg/dL / Nitrite: Negative   Leuk Esterase: Moderate / RBC: 6-10 /HPF / WBC 6-10   Sq Epi: x / Non Sq Epi: Few / Bacteria: Moderate        RADIOLOGY & ADDITIONAL TESTS:    Imaging Personally Reviewed:  [x] YES  [ ] NO    Consultant(s) Notes Reviewed:  [x] YES  [ ] NO      MEDICATIONS  (STANDING):  ALBUTerol/ipratropium for Nebulization 3 milliLiter(s) Nebulizer every 6 hours  atorvastatin 10 milliGRAM(s) Oral at bedtime  cefTRIAXone   IVPB      cefTRIAXone   IVPB 1 Gram(s) IV Intermittent every 24 hours  latanoprost 0.005% Ophthalmic Solution 1 Drop(s) Both EYES at bedtime  levothyroxine 100 MICROGram(s) Oral daily  oseltamivir 30 milliGRAM(s) Oral daily  tamsulosin 0.4 milliGRAM(s) Oral at bedtime    MEDICATIONS  (PRN):  acetaminophen   Tablet 650 milliGRAM(s) Oral every 6 hours PRN For Temp greater than 38 C (100.4 F)  guaiFENesin   Syrup  (Sugar-Free) 100 milliGRAM(s) Oral every 6 hours PRN Cough  ondansetron Injectable 4 milliGRAM(s) IV Push every 6 hours PRN Nausea      Care Discussed with Consultants/Other Providers [x] YES  [ ] NO    HEALTH ISSUES - PROBLEM Dx:  Elevated troponin: Elevated troponin  Hypothyroidism, unspecified type: Hypothyroidism, unspecified type  Other hyperlipidemia: Other hyperlipidemia  Subdural hematoma: Subdural hematoma  Alzheimer's dementia without behavioral disturbance, unspecified timing of dementia onset: Alzheimer&#x27;s dementia without behavioral disturbance, unspecified timing of dementia onset  Abnormal urinalysis: Abnormal urinalysis  Syncope, unspecified syncope type: Syncope, unspecified syncope type  Influenza: Influenza

## 2018-02-03 LAB
-  AMIKACIN: SIGNIFICANT CHANGE UP
-  AMPICILLIN/SULBACTAM: SIGNIFICANT CHANGE UP
-  AMPICILLIN: SIGNIFICANT CHANGE UP
-  AZTREONAM: SIGNIFICANT CHANGE UP
-  CEFAZOLIN: SIGNIFICANT CHANGE UP
-  CEFEPIME: SIGNIFICANT CHANGE UP
-  CEFOXITIN: SIGNIFICANT CHANGE UP
-  CEFTAZIDIME: SIGNIFICANT CHANGE UP
-  CEFTRIAXONE: SIGNIFICANT CHANGE UP
-  CIPROFLOXACIN: SIGNIFICANT CHANGE UP
-  ERTAPENEM: SIGNIFICANT CHANGE UP
-  GENTAMICIN: SIGNIFICANT CHANGE UP
-  IMIPENEM: SIGNIFICANT CHANGE UP
-  LEVOFLOXACIN: SIGNIFICANT CHANGE UP
-  MEROPENEM: SIGNIFICANT CHANGE UP
-  NITROFURANTOIN: SIGNIFICANT CHANGE UP
-  PIPERACILLIN/TAZOBACTAM: SIGNIFICANT CHANGE UP
-  TOBRAMYCIN: SIGNIFICANT CHANGE UP
-  TRIMETHOPRIM/SULFAMETHOXAZOLE: SIGNIFICANT CHANGE UP
ANION GAP SERPL CALC-SCNC: 15 MMOL/L — SIGNIFICANT CHANGE UP (ref 5–17)
BUN SERPL-MCNC: 28 MG/DL — HIGH (ref 7–23)
CALCIUM SERPL-MCNC: 8.6 MG/DL — SIGNIFICANT CHANGE UP (ref 8.4–10.5)
CHLORIDE SERPL-SCNC: 104 MMOL/L — SIGNIFICANT CHANGE UP (ref 96–108)
CO2 SERPL-SCNC: 20 MMOL/L — LOW (ref 22–31)
CREAT SERPL-MCNC: 1.23 MG/DL — SIGNIFICANT CHANGE UP (ref 0.5–1.3)
CULTURE RESULTS: SIGNIFICANT CHANGE UP
GLUCOSE SERPL-MCNC: 99 MG/DL — SIGNIFICANT CHANGE UP (ref 70–99)
HCT VFR BLD CALC: 29.4 % — LOW (ref 39–50)
HGB BLD-MCNC: 9.6 G/DL — LOW (ref 13–17)
MAGNESIUM SERPL-MCNC: 2 MG/DL — SIGNIFICANT CHANGE UP (ref 1.6–2.6)
MCHC RBC-ENTMCNC: 30 PG — SIGNIFICANT CHANGE UP (ref 27–34)
MCHC RBC-ENTMCNC: 32.7 GM/DL — SIGNIFICANT CHANGE UP (ref 32–36)
MCV RBC AUTO: 91.9 FL — SIGNIFICANT CHANGE UP (ref 80–100)
METHOD TYPE: SIGNIFICANT CHANGE UP
ORGANISM # SPEC MICROSCOPIC CNT: SIGNIFICANT CHANGE UP
ORGANISM # SPEC MICROSCOPIC CNT: SIGNIFICANT CHANGE UP
PHOSPHATE SERPL-MCNC: 3.6 MG/DL — SIGNIFICANT CHANGE UP (ref 2.5–4.5)
PLATELET # BLD AUTO: 283 K/UL — SIGNIFICANT CHANGE UP (ref 150–400)
POTASSIUM SERPL-MCNC: 4.5 MMOL/L — SIGNIFICANT CHANGE UP (ref 3.5–5.3)
POTASSIUM SERPL-SCNC: 4.5 MMOL/L — SIGNIFICANT CHANGE UP (ref 3.5–5.3)
PROCALCITONIN SERPL-MCNC: 0.09 NG/ML — HIGH (ref 0–0.04)
RBC # BLD: 3.2 M/UL — LOW (ref 4.2–5.8)
RBC # FLD: 14.2 % — SIGNIFICANT CHANGE UP (ref 10.3–14.5)
SODIUM SERPL-SCNC: 139 MMOL/L — SIGNIFICANT CHANGE UP (ref 135–145)
SPECIMEN SOURCE: SIGNIFICANT CHANGE UP
WBC # BLD: 10.14 K/UL — SIGNIFICANT CHANGE UP (ref 3.8–10.5)
WBC # FLD AUTO: 10.14 K/UL — SIGNIFICANT CHANGE UP (ref 3.8–10.5)

## 2018-02-03 PROCEDURE — 93010 ELECTROCARDIOGRAM REPORT: CPT

## 2018-02-03 RX ADMIN — Medication 3 MILLILITER(S): at 21:23

## 2018-02-03 RX ADMIN — Medication 30 MILLIGRAM(S): at 11:24

## 2018-02-03 RX ADMIN — Medication 3 MILLILITER(S): at 05:33

## 2018-02-03 RX ADMIN — Medication 3 MILLILITER(S): at 18:20

## 2018-02-03 RX ADMIN — CEFTRIAXONE 100 GRAM(S): 500 INJECTION, POWDER, FOR SOLUTION INTRAMUSCULAR; INTRAVENOUS at 19:59

## 2018-02-03 RX ADMIN — ATORVASTATIN CALCIUM 10 MILLIGRAM(S): 80 TABLET, FILM COATED ORAL at 21:23

## 2018-02-03 RX ADMIN — Medication 3 MILLILITER(S): at 11:24

## 2018-02-03 RX ADMIN — Medication 125 MICROGRAM(S): at 05:33

## 2018-02-03 RX ADMIN — TAMSULOSIN HYDROCHLORIDE 0.4 MILLIGRAM(S): 0.4 CAPSULE ORAL at 21:23

## 2018-02-03 RX ADMIN — LATANOPROST 1 DROP(S): 0.05 SOLUTION/ DROPS OPHTHALMIC; TOPICAL at 21:23

## 2018-02-03 NOTE — PROGRESS NOTE ADULT - SUBJECTIVE AND OBJECTIVE BOX
Follow-up Pulm Progress Note  Oz Becerra MD  [phone]    No new respiratory events overnight.    OOB - feels well, denies SOB  Urine culture: Enterobacter >100K  O2 sat 97% on RA    Medications:  Vital Signs Last 24 Hrs  T(C): 36.7 (03 Feb 2018 06:16), Max: 37.2 (02 Feb 2018 15:37)  T(F): 98 (03 Feb 2018 06:16), Max: 99 (02 Feb 2018 15:37)  HR: 68 (03 Feb 2018 06:16) (67 - 72)  BP: 122/60 (03 Feb 2018 06:16) (113/60 - 131/70)  BP(mean): --  RR: 18 (03 Feb 2018 06:16) (18 - 20)  SpO2: 95% (03 Feb 2018 06:16) (95% - 100%)    LABS:                        9.6    10.14 )-----------( 283      ( 03 Feb 2018 07:10 )             29.4     02-03    139  |  104  |  28<H>  ----------------------------<  99  4.5   |  20<L>  |  1.23    Ca    8.6      03 Feb 2018 07:04  Phos  3.6     02-03  Mg     2.0     02-03    TPro  6.1  /  Alb  3.1<L>  /  TBili  0.2  /  DBili  x   /  AST  24  /  ALT  11  /  AlkPhos  73  02-02    CULTURES:  Culture Results:   No growth to date. (02-01 @ 23:51)  Culture Results:   No growth to date. (02-01 @ 23:51)  Culture Results:   >100,000 CFU/ml Enterobacter cloacae/asburiae (02-01 @ 15:03)  Culture Results:   No growth to date. (02-01 @ 12:33)  Culture Results:   No growth to date. (02-01 @ 12:33)    Most recent blood culture -- 02-01 @ 23:51   -- -- .Blood Blood 02-01 @ 23:51  Most recent blood culture -- 02-01 @ 15:03   Enterobacter cloacae/absuria Enterobacter cloacae/absuria .Urine Clean Catch (Midstream) 02-01 @ 15:03  Most recent blood culture -- 02-01 @ 12:33   -- -- .Blood Blood 02-01 @ 12:33      Physical Examination:  PULM:   CVS: Regular rate and rhythm, no murmurs, rubs, or gallops  ABD: Soft, non-tender  EXT:  No clubbing, cyanosis, or edema    RADIOLOGY REVIEWED  CXR:    CT chest:    TTE:

## 2018-02-03 NOTE — PROGRESS NOTE ADULT - PROBLEM SELECTOR PLAN 2
-  - some PAT on telemetry.  - will cont to monitor.  - check echo as noted above.    will follow.    Braulio Mireles M.D., [facility]  [phone]

## 2018-02-03 NOTE — PROGRESS NOTE ADULT - SUBJECTIVE AND OBJECTIVE BOX
Patient is a 89y old  Male who presents with a chief complaint of Syncope and subdural hemorrhage from [facility] (2018 20:00)      SUBJECTIVE / OVERNIGHT EVENTS:    He denies any CP or SOB acutely this morning; sleeping in chair    Vital Signs Last 24 Hrs  T(C): 36.7 (2018 06:16), Max: 37.2 (2018 15:37)  T(F): 98 (2018 06:16), Max: 99 (2018 15:37)  HR: 66 (2018 08:00) (66 - 72)  BP: 126/61 (2018 08:00) (113/60 - 131/70)  BP(mean): --  RR: 18 (2018 06:16) (18 - 20)  SpO2: 95% (2018 06:16) (95% - 100%)  I&O's Summary      GENERAL: NAD, AAOx3  HEAD:  Atraumatic, Normocephalic  EYES: EOMI  NECK: Supple, No JVD, No LAD  CHEST/LUNG: no wheezes or rales  HEART: Regular rate and rhythm; No murmurs, rubs, or gallops  ABDOMEN: Soft, Nontender, Nondistended; Bowel sounds present  EXTREMITIES:  2+ Peripheral Pulses, No LE edema  SKIN: No rashes or lesions  NEURO: nonfocal CN/motor/sensory/reflexes    LABS:                        9.6    10.14 )-----------( 283      ( 2018 07:10 )             29.4     02-03    139  |  104  |  28<H>  ----------------------------<  99  4.5   |  20<L>  |  1.23    Ca    8.6      2018 07:04  Phos  3.6     02-03  Mg     2.0     02-03    TPro  6.1  /  Alb  3.1<L>  /  TBili  0.2  /  DBili  x   /  AST  24  /  ALT  11  /  AlkPhos  73  02-02      CAPILLARY BLOOD GLUCOSE        CARDIAC MARKERS ( 2018 02:59 )  x     / 0.11 ng/mL / x     / x     / x      CARDIAC MARKERS ( 2018 21:51 )  x     / 0.13 ng/mL / 222 U/L / x     / 4.2 ng/mL  CARDIAC MARKERS ( 2018 14:55 )  x     / 0.10 ng/mL / 257 U/L / x     / 4.0 ng/mL      Urinalysis Basic - ( 2018 12:32 )    Color: Yellow / Appearance: very cloudy / S.010 / pH: x  Gluc: x / Ketone: Negative  / Bili: Negative / Urobili: Negative mg/dL   Blood: x / Protein: 100 mg/dL / Nitrite: Negative   Leuk Esterase: Moderate / RBC: 6-10 /HPF / WBC 6-10   Sq Epi: x / Non Sq Epi: Few / Bacteria: Moderate        RADIOLOGY & ADDITIONAL TESTS:    Imaging Personally Reviewed:  [x] YES  [ ] NO    Consultant(s) Notes Reviewed:  [x] YES  [ ] NO      MEDICATIONS  (STANDING):  ALBUTerol/ipratropium for Nebulization 3 milliLiter(s) Nebulizer every 6 hours  atorvastatin 10 milliGRAM(s) Oral at bedtime  cefTRIAXone   IVPB      cefTRIAXone   IVPB 1 Gram(s) IV Intermittent every 24 hours  latanoprost 0.005% Ophthalmic Solution 1 Drop(s) Both EYES at bedtime  levothyroxine 125 MICROGram(s) Oral daily  oseltamivir 30 milliGRAM(s) Oral daily  tamsulosin 0.4 milliGRAM(s) Oral at bedtime    MEDICATIONS  (PRN):  acetaminophen   Tablet 650 milliGRAM(s) Oral every 6 hours PRN For Temp greater than 38 C (100.4 F)  guaiFENesin   Syrup  (Sugar-Free) 100 milliGRAM(s) Oral every 6 hours PRN Cough  ondansetron Injectable 4 milliGRAM(s) IV Push every 6 hours PRN Nausea      Care Discussed with Consultants/Other Providers [x] YES  [ ] NO    HEALTH ISSUES - PROBLEM Dx:  Elevated troponin: Elevated troponin  Hypothyroidism, unspecified type: Hypothyroidism, unspecified type  Other hyperlipidemia: Other hyperlipidemia  Subdural hematoma: Subdural hematoma  Alzheimer's dementia without behavioral disturbance, unspecified timing of dementia onset: Alzheimer&#x27;s dementia without behavioral disturbance, unspecified timing of dementia onset  Abnormal urinalysis: Abnormal urinalysis  Syncope, unspecified syncope type: Syncope, unspecified syncope type  Influenza: Influenza Patient is a 89y old  Male who presents with a chief complaint of Syncope and subdural hemorrhage from [facility] (2018 20:00)      SUBJECTIVE / OVERNIGHT EVENTS:    He denies any CP or SOB acutely this morning; sleeping in chair  tele showed PATs but pt asymptomatic    Vital Signs Last 24 Hrs  T(C): 36.7 (2018 06:16), Max: 37.2 (2018 15:37)  T(F): 98 (2018 06:16), Max: 99 (2018 15:37)  HR: 66 (2018 08:00) (66 - 72)  BP: 126/61 (2018 08:00) (113/60 - 131/70)  BP(mean): --  RR: 18 (2018 06:16) (18 - 20)  SpO2: 95% (2018 06:16) (95% - 100%)  I&O's Summary      GENERAL: NAD, AAOx3  HEAD:  Atraumatic, Normocephalic  EYES: EOMI  NECK: Supple, No JVD, No LAD  CHEST/LUNG: no wheezes or rales  HEART: Regular rate and rhythm; No murmurs, rubs, or gallops  ABDOMEN: Soft, Nontender, Nondistended; Bowel sounds present  EXTREMITIES:  2+ Peripheral Pulses, No LE edema  SKIN: No rashes or lesions  NEURO: nonfocal CN/motor/sensory/reflexes    LABS:                        9.6    10.14 )-----------( 283      ( 2018 07:10 )             29.4     02-03    139  |  104  |  28<H>  ----------------------------<  99  4.5   |  20<L>  |  1.23    Ca    8.6      2018 07:04  Phos  3.6     02-03  Mg     2.0     02-03    TPro  6.1  /  Alb  3.1<L>  /  TBili  0.2  /  DBili  x   /  AST  24  /  ALT  11  /  AlkPhos  73  02-02      CAPILLARY BLOOD GLUCOSE        CARDIAC MARKERS ( 2018 02:59 )  x     / 0.11 ng/mL / x     / x     / x      CARDIAC MARKERS ( 2018 21:51 )  x     / 0.13 ng/mL / 222 U/L / x     / 4.2 ng/mL  CARDIAC MARKERS ( 2018 14:55 )  x     / 0.10 ng/mL / 257 U/L / x     / 4.0 ng/mL      Urinalysis Basic - ( 2018 12:32 )    Color: Yellow / Appearance: very cloudy / S.010 / pH: x  Gluc: x / Ketone: Negative  / Bili: Negative / Urobili: Negative mg/dL   Blood: x / Protein: 100 mg/dL / Nitrite: Negative   Leuk Esterase: Moderate / RBC: 6-10 /HPF / WBC 6-10   Sq Epi: x / Non Sq Epi: Few / Bacteria: Moderate        RADIOLOGY & ADDITIONAL TESTS:    Imaging Personally Reviewed:  [x] YES  [ ] NO    Consultant(s) Notes Reviewed:  [x] YES  [ ] NO      MEDICATIONS  (STANDING):  ALBUTerol/ipratropium for Nebulization 3 milliLiter(s) Nebulizer every 6 hours  atorvastatin 10 milliGRAM(s) Oral at bedtime  cefTRIAXone   IVPB      cefTRIAXone   IVPB 1 Gram(s) IV Intermittent every 24 hours  latanoprost 0.005% Ophthalmic Solution 1 Drop(s) Both EYES at bedtime  levothyroxine 125 MICROGram(s) Oral daily  oseltamivir 30 milliGRAM(s) Oral daily  tamsulosin 0.4 milliGRAM(s) Oral at bedtime    MEDICATIONS  (PRN):  acetaminophen   Tablet 650 milliGRAM(s) Oral every 6 hours PRN For Temp greater than 38 C (100.4 F)  guaiFENesin   Syrup  (Sugar-Free) 100 milliGRAM(s) Oral every 6 hours PRN Cough  ondansetron Injectable 4 milliGRAM(s) IV Push every 6 hours PRN Nausea      Care Discussed with Consultants/Other Providers [x] YES  [ ] NO    HEALTH ISSUES - PROBLEM Dx:  Elevated troponin: Elevated troponin  Hypothyroidism, unspecified type: Hypothyroidism, unspecified type  Other hyperlipidemia: Other hyperlipidemia  Subdural hematoma: Subdural hematoma  Alzheimer's dementia without behavioral disturbance, unspecified timing of dementia onset: Alzheimer&#x27;s dementia without behavioral disturbance, unspecified timing of dementia onset  Abnormal urinalysis: Abnormal urinalysis  Syncope, unspecified syncope type: Syncope, unspecified syncope type  Influenza: Influenza Patient is a 89y old  Male who presents with a chief complaint of Syncope and subdural hemorrhage from [facility] (2018 20:00)      SUBJECTIVE / OVERNIGHT EVENTS:    He denies any CP or SOB acutely this morning; sleeping in chair  tele showed PATs but pt asymptomatic  daughters say his mental status is at baseline.    Vital Signs Last 24 Hrs  T(C): 36.7 (2018 06:16), Max: 37.2 (2018 15:37)  T(F): 98 (2018 06:16), Max: 99 (2018 15:37)  HR: 66 (2018 08:00) (66 - 72)  BP: 126/61 (2018 08:00) (113/60 - 131/70)  BP(mean): --  RR: 18 (2018 06:16) (18 - 20)  SpO2: 95% (2018 06:16) (95% - 100%)  I&O's Summary      GENERAL: NAD, AAOx3  HEAD:  Atraumatic, Normocephalic  EYES: EOMI  NECK: Supple, No JVD, No LAD  CHEST/LUNG: no wheezes or rales  HEART: Regular rate and rhythm; No murmurs, rubs, or gallops  ABDOMEN: Soft, Nontender, Nondistended; Bowel sounds present  EXTREMITIES:  2+ Peripheral Pulses, No LE edema  SKIN: No rashes or lesions  NEURO: nonfocal CN/motor/sensory/reflexes    LABS:                        9.6    10.14 )-----------( 283      ( 2018 07:10 )             29.4     02-03    139  |  104  |  28<H>  ----------------------------<  99  4.5   |  20<L>  |  1.23    Ca    8.6      2018 07:04  Phos  3.6     02-03  Mg     2.0     02-03    TPro  6.1  /  Alb  3.1<L>  /  TBili  0.2  /  DBili  x   /  AST  24  /  ALT  11  /  AlkPhos  73  02-02      CAPILLARY BLOOD GLUCOSE        CARDIAC MARKERS ( 2018 02:59 )  x     / 0.11 ng/mL / x     / x     / x      CARDIAC MARKERS ( 2018 21:51 )  x     / 0.13 ng/mL / 222 U/L / x     / 4.2 ng/mL  CARDIAC MARKERS ( 2018 14:55 )  x     / 0.10 ng/mL / 257 U/L / x     / 4.0 ng/mL      Urinalysis Basic - ( 2018 12:32 )    Color: Yellow / Appearance: very cloudy / S.010 / pH: x  Gluc: x / Ketone: Negative  / Bili: Negative / Urobili: Negative mg/dL   Blood: x / Protein: 100 mg/dL / Nitrite: Negative   Leuk Esterase: Moderate / RBC: 6-10 /HPF / WBC 6-10   Sq Epi: x / Non Sq Epi: Few / Bacteria: Moderate        RADIOLOGY & ADDITIONAL TESTS:    Imaging Personally Reviewed:  [x] YES  [ ] NO    Consultant(s) Notes Reviewed:  [x] YES  [ ] NO      MEDICATIONS  (STANDING):  ALBUTerol/ipratropium for Nebulization 3 milliLiter(s) Nebulizer every 6 hours  atorvastatin 10 milliGRAM(s) Oral at bedtime  cefTRIAXone   IVPB      cefTRIAXone   IVPB 1 Gram(s) IV Intermittent every 24 hours  latanoprost 0.005% Ophthalmic Solution 1 Drop(s) Both EYES at bedtime  levothyroxine 125 MICROGram(s) Oral daily  oseltamivir 30 milliGRAM(s) Oral daily  tamsulosin 0.4 milliGRAM(s) Oral at bedtime    MEDICATIONS  (PRN):  acetaminophen   Tablet 650 milliGRAM(s) Oral every 6 hours PRN For Temp greater than 38 C (100.4 F)  guaiFENesin   Syrup  (Sugar-Free) 100 milliGRAM(s) Oral every 6 hours PRN Cough  ondansetron Injectable 4 milliGRAM(s) IV Push every 6 hours PRN Nausea      Care Discussed with Consultants/Other Providers [x] YES  [ ] NO    HEALTH ISSUES - PROBLEM Dx:  Elevated troponin: Elevated troponin  Hypothyroidism, unspecified type: Hypothyroidism, unspecified type  Other hyperlipidemia: Other hyperlipidemia  Subdural hematoma: Subdural hematoma  Alzheimer's dementia without behavioral disturbance, unspecified timing of dementia onset: Alzheimer&#x27;s dementia without behavioral disturbance, unspecified timing of dementia onset  Abnormal urinalysis: Abnormal urinalysis  Syncope, unspecified syncope type: Syncope, unspecified syncope type  Influenza: Influenza

## 2018-02-03 NOTE — PROGRESS NOTE ADULT - SUBJECTIVE AND OBJECTIVE BOX
[facility] Cardiology Progress Note  _______________________________    Pt. seen and examined. No new cardiac-related complaints.    Telemetry - sinus 60-70's.     T(C): 36.7 (18 @ 06:16), Max: 37.2 (18 @ 15:37)  HR: 66 (18 @ 08:00) (66 - 72)  BP: 126/61 (18 @ 08:00) (113/60 - 131/70)  RR: 18 (18 @ 06:16) (18 - 20)  SpO2: 95% (18 @ 06:16) (95% - 100%)  I&O's Summary    2018 07:01  -  2018 12:17  --------------------------------------------------------  IN: 240 mL / OUT: 0 mL / NET: 240 mL        PHYSICAL EXAM:  GENERAL: Alert, NAD  NECK: Supple, No JVD, No carotid bruit.  CHEST/LUNG: scattered crackles.  HEART: S1 S2 normal, RRR,  No murmurs, rubs, or gallops  ABDOMEN: Soft, Nontender, Nondistended; Bowel sounds present  EXTREMITIES:  No LE edema.    LABS:                        9.6    10.14 )-----------( 283      ( 2018 07:10 )             29.4     02-03    139  |  104  |  28<H>  ----------------------------<  99  4.5   |  20<L>  |  1.23    Ca    8.6      2018 07:04  Phos  3.6     02-03  Mg     2.0     02-    TPro  6.1  /  Alb  3.1<L>  /  TBili  0.2  /  DBili  x   /  AST  24  /  ALT  11  /  AlkPhos  73  02-02      CARDIAC MARKERS ( 2018 02:59 )  x     / 0.11 ng/mL / x     / x     / x      CARDIAC MARKERS ( 2018 21:51 )  x     / 0.13 ng/mL / 222 U/L / x     / 4.2 ng/mL  CARDIAC MARKERS ( 2018 14:55 )  x     / 0.10 ng/mL / 257 U/L / x     / 4.0 ng/mL  CARDIAC MARKERS ( 2018 07:22 )  .372 ng/mL / x     / 292 U/L / x     / 2.1 ng/mL          Urinalysis Basic - ( 2018 12:32 )    Color: Yellow / Appearance: very cloudy / S.010 / pH: x  Gluc: x / Ketone: Negative  / Bili: Negative / Urobili: Negative mg/dL   Blood: x / Protein: 100 mg/dL / Nitrite: Negative   Leuk Esterase: Moderate / RBC: 6-10 /HPF / WBC 6-10   Sq Epi: x / Non Sq Epi: Few / Bacteria: Moderate        MEDICATIONS  (STANDING):  ALBUTerol/ipratropium for Nebulization 3 milliLiter(s) Nebulizer every 6 hours  atorvastatin 10 milliGRAM(s) Oral at bedtime  cefTRIAXone   IVPB      cefTRIAXone   IVPB 1 Gram(s) IV Intermittent every 24 hours  latanoprost 0.005% Ophthalmic Solution 1 Drop(s) Both EYES at bedtime  levothyroxine 125 MICROGram(s) Oral daily  oseltamivir 30 milliGRAM(s) Oral daily  tamsulosin 0.4 milliGRAM(s) Oral at bedtime    MEDICATIONS  (PRN):  acetaminophen   Tablet 650 milliGRAM(s) Oral every 6 hours PRN For Temp greater than 38 C (100.4 F)  guaiFENesin   Syrup  (Sugar-Free) 100 milliGRAM(s) Oral every 6 hours PRN Cough  ondansetron Injectable 4 milliGRAM(s) IV Push every 6 hours PRN Nausea      RADIOLOGY & ADDITIONAL TESTS: [facility] Cardiology Progress Note  _______________________________    Pt. seen and examined. No new cardiac-related complaints.    Telemetry - sinus 60-70's.  's for 5.2 seconds.    T(C): 36.7 (18 @ 06:16), Max: 37.2 (18 @ 15:37)  HR: 66 (18 @ 08:00) (66 - 72)  BP: 126/61 (18 @ 08:00) (113/60 - 131/70)  RR: 18 (18 @ 06:16) (18 - 20)  SpO2: 95% (18 @ 06:16) (95% - 100%)  I&O's Summary    2018 07:01  -  2018 12:17  --------------------------------------------------------  IN: 240 mL / OUT: 0 mL / NET: 240 mL        PHYSICAL EXAM:  GENERAL: Alert, NAD  NECK: Supple, No JVD, No carotid bruit.  CHEST/LUNG: scattered crackles.  HEART: S1 S2 normal, RRR,  No murmurs, rubs, or gallops  ABDOMEN: Soft, Nontender, Nondistended; Bowel sounds present  EXTREMITIES:  No LE edema.    LABS:                        9.6    10.14 )-----------( 283      ( 2018 07:10 )             29.4     02-03    139  |  104  |  28<H>  ----------------------------<  99  4.5   |  20<L>  |  1.23    Ca    8.6      2018 07:04  Phos  3.6     02-03  Mg     2.0     02-03    TPro  6.1  /  Alb  3.1<L>  /  TBili  0.2  /  DBili  x   /  AST  24  /  ALT  11  /  AlkPhos  73  02-02      CARDIAC MARKERS ( 2018 02:59 )  x     / 0.11 ng/mL / x     / x     / x      CARDIAC MARKERS ( 2018 21:51 )  x     / 0.13 ng/mL / 222 U/L / x     / 4.2 ng/mL  CARDIAC MARKERS ( 2018 14:55 )  x     / 0.10 ng/mL / 257 U/L / x     / 4.0 ng/mL  CARDIAC MARKERS ( 2018 07:22 )  .372 ng/mL / x     / 292 U/L / x     / 2.1 ng/mL          Urinalysis Basic - ( 2018 12:32 )    Color: Yellow / Appearance: very cloudy / S.010 / pH: x  Gluc: x / Ketone: Negative  / Bili: Negative / Urobili: Negative mg/dL   Blood: x / Protein: 100 mg/dL / Nitrite: Negative   Leuk Esterase: Moderate / RBC: 6-10 /HPF / WBC 6-10   Sq Epi: x / Non Sq Epi: Few / Bacteria: Moderate        MEDICATIONS  (STANDING):  ALBUTerol/ipratropium for Nebulization 3 milliLiter(s) Nebulizer every 6 hours  atorvastatin 10 milliGRAM(s) Oral at bedtime  cefTRIAXone   IVPB      cefTRIAXone   IVPB 1 Gram(s) IV Intermittent every 24 hours  latanoprost 0.005% Ophthalmic Solution 1 Drop(s) Both EYES at bedtime  levothyroxine 125 MICROGram(s) Oral daily  oseltamivir 30 milliGRAM(s) Oral daily  tamsulosin 0.4 milliGRAM(s) Oral at bedtime    MEDICATIONS  (PRN):  acetaminophen   Tablet 650 milliGRAM(s) Oral every 6 hours PRN For Temp greater than 38 C (100.4 F)  guaiFENesin   Syrup  (Sugar-Free) 100 milliGRAM(s) Oral every 6 hours PRN Cough  ondansetron Injectable 4 milliGRAM(s) IV Push every 6 hours PRN Nausea      RADIOLOGY & ADDITIONAL TESTS:

## 2018-02-04 LAB
ANION GAP SERPL CALC-SCNC: 14 MMOL/L — SIGNIFICANT CHANGE UP (ref 5–17)
APPEARANCE UR: ABNORMAL
BILIRUB UR-MCNC: NEGATIVE — SIGNIFICANT CHANGE UP
BUN SERPL-MCNC: 32 MG/DL — HIGH (ref 7–23)
CALCIUM SERPL-MCNC: 8.7 MG/DL — SIGNIFICANT CHANGE UP (ref 8.4–10.5)
CHLORIDE SERPL-SCNC: 104 MMOL/L — SIGNIFICANT CHANGE UP (ref 96–108)
CO2 SERPL-SCNC: 19 MMOL/L — LOW (ref 22–31)
COLOR SPEC: YELLOW — SIGNIFICANT CHANGE UP
CREAT SERPL-MCNC: 1.13 MG/DL — SIGNIFICANT CHANGE UP (ref 0.5–1.3)
DIFF PNL FLD: ABNORMAL
GLUCOSE SERPL-MCNC: 99 MG/DL — SIGNIFICANT CHANGE UP (ref 70–99)
GLUCOSE UR QL: NEGATIVE — SIGNIFICANT CHANGE UP
HCT VFR BLD CALC: 27.6 % — LOW (ref 39–50)
HGB BLD-MCNC: 8.9 G/DL — LOW (ref 13–17)
KETONES UR-MCNC: NEGATIVE — SIGNIFICANT CHANGE UP
LEUKOCYTE ESTERASE UR-ACNC: ABNORMAL
MCHC RBC-ENTMCNC: 28.9 PG — SIGNIFICANT CHANGE UP (ref 27–34)
MCHC RBC-ENTMCNC: 32.2 GM/DL — SIGNIFICANT CHANGE UP (ref 32–36)
MCV RBC AUTO: 89.6 FL — SIGNIFICANT CHANGE UP (ref 80–100)
NITRITE UR-MCNC: NEGATIVE — SIGNIFICANT CHANGE UP
PH UR: 6 — SIGNIFICANT CHANGE UP (ref 5–8)
PLATELET # BLD AUTO: 277 K/UL — SIGNIFICANT CHANGE UP (ref 150–400)
POTASSIUM SERPL-MCNC: 4.9 MMOL/L — SIGNIFICANT CHANGE UP (ref 3.5–5.3)
POTASSIUM SERPL-SCNC: 4.9 MMOL/L — SIGNIFICANT CHANGE UP (ref 3.5–5.3)
PROT UR-MCNC: 100 MG/DL
RBC # BLD: 3.08 M/UL — LOW (ref 4.2–5.8)
RBC # FLD: 14.5 % — SIGNIFICANT CHANGE UP (ref 10.3–14.5)
SODIUM SERPL-SCNC: 137 MMOL/L — SIGNIFICANT CHANGE UP (ref 135–145)
SP GR SPEC: 1.02 — SIGNIFICANT CHANGE UP (ref 1.01–1.02)
UROBILINOGEN FLD QL: NEGATIVE — SIGNIFICANT CHANGE UP
WBC # BLD: 9.31 K/UL — SIGNIFICANT CHANGE UP (ref 3.8–10.5)
WBC # FLD AUTO: 9.31 K/UL — SIGNIFICANT CHANGE UP (ref 3.8–10.5)

## 2018-02-04 RX ADMIN — Medication 3 MILLILITER(S): at 13:01

## 2018-02-04 RX ADMIN — Medication 3 MILLILITER(S): at 22:18

## 2018-02-04 RX ADMIN — CEFTRIAXONE 100 GRAM(S): 500 INJECTION, POWDER, FOR SOLUTION INTRAMUSCULAR; INTRAVENOUS at 22:18

## 2018-02-04 RX ADMIN — ATORVASTATIN CALCIUM 10 MILLIGRAM(S): 80 TABLET, FILM COATED ORAL at 21:16

## 2018-02-04 RX ADMIN — LATANOPROST 1 DROP(S): 0.05 SOLUTION/ DROPS OPHTHALMIC; TOPICAL at 21:16

## 2018-02-04 RX ADMIN — TAMSULOSIN HYDROCHLORIDE 0.4 MILLIGRAM(S): 0.4 CAPSULE ORAL at 21:16

## 2018-02-04 RX ADMIN — Medication 3 MILLILITER(S): at 05:19

## 2018-02-04 RX ADMIN — Medication 125 MICROGRAM(S): at 05:19

## 2018-02-04 RX ADMIN — Medication 30 MILLIGRAM(S): at 13:00

## 2018-02-04 RX ADMIN — Medication 3 MILLILITER(S): at 17:12

## 2018-02-04 NOTE — PROGRESS NOTE ADULT - PROBLEM SELECTOR PLAN 2
-  - some PAT yest on telemetry.  - will cont to monitor.  - check echo as noted above.    will follow.    Braulio Mireles M.D., [facility]  [phone]

## 2018-02-04 NOTE — PROGRESS NOTE ADULT - SUBJECTIVE AND OBJECTIVE BOX
Urine appearing cloudy  no dysuria or urgency    Vital Signs Last 24 Hrs  T(C): 36.8 (04 Feb 2018 05:05), Max: 37.1 (03 Feb 2018 20:39)  T(F): 98.2 (04 Feb 2018 05:05), Max: 98.7 (03 Feb 2018 20:39)  HR: 77 (04 Feb 2018 05:05) (73 - 82)  BP: 124/62 (04 Feb 2018 05:05) (123/59 - 135/66)  BP(mean): --  RR: 18 (04 Feb 2018 05:05) (17 - 18)  SpO2: 96% (04 Feb 2018 05:05) (96% - 98%)    GENERAL: NAD, AAOx3  HEAD:  Atraumatic, Normocephalic  EYES: EOMI  NECK: Supple, No JVD, No LAD  CHEST/LUNG: no wheezes or rales  HEART: Regular rate and rhythm; No murmurs, rubs, or gallops  ABDOMEN: Soft, Nontender, Nondistended; Bowel sounds present  EXTREMITIES:  2+ Peripheral Pulses, No LE edema  SKIN: No rashes or lesions  NEURO: nonfocal CN/motor/sensory/reflexes    LABS:                        8.9    9.31  )-----------( 277      ( 04 Feb 2018 08:42 )             27.6     02-04    137  |  104  |  32<H>  ----------------------------<  99  4.9   |  19<L>  |  1.13    Ca    8.7      04 Feb 2018 08:28  Phos  3.6     02-03  Mg     2.0     02-03        CAPILLARY BLOOD GLUCOSE

## 2018-02-04 NOTE — PROGRESS NOTE ADULT - SUBJECTIVE AND OBJECTIVE BOX
Follow-up Pulm Progress Note  Oz Becerra MD  [phone]    No new respiratory events overnight.    OOB - feels well, denies SOB  Afebrile on ceftriaxone for Enterobacter UTI  O2 sat 97% on RA    Vital Signs Last 24 Hrs  T(C): 36.8 (04 Feb 2018 05:05), Max: 37.1 (03 Feb 2018 20:39)  T(F): 98.2 (04 Feb 2018 05:05), Max: 98.7 (03 Feb 2018 20:39)  HR: 77 (04 Feb 2018 05:05) (73 - 82)  BP: 124/62 (04 Feb 2018 05:05) (123/59 - 135/66)  BP(mean): --  RR: 18 (04 Feb 2018 05:05) (17 - 18)  SpO2: 96% (04 Feb 2018 05:05) (96% - 98%)                          8.9    9.31  )-----------( 277      ( 04 Feb 2018 08:42 )             27.6       02-04    137  |  104  |  32<H>  ----------------------------<  99  4.9   |  19<L>  |  1.13    Ca    8.7      04 Feb 2018 08:28  Phos  3.6     02-03  Mg     2.0     02-03      CULTURES:  Culture Results:   No growth to date. (02-01 @ 23:51)  Culture Results:   No growth to date. (02-01 @ 23:51)  Culture Results:   >100,000 CFU/ml Enterobacter cloacae/asburiae (02-01 @ 15:03)  Culture Results:   No growth to date. (02-01 @ 12:33)  Culture Results:   No growth to date. (02-01 @ 12:33)    Most recent blood culture -- 02-01 @ 23:51   -- -- .Blood Blood 02-01 @ 23:51  Most recent blood culture -- 02-01 @ 15:03   Enterobacter cloacae/absuria Enterobacter cloacae/absuria .Urine Clean Catch (Midstream) 02-01 @ 15:03  Most recent blood culture -- 02-01 @ 12:33   -- -- .Blood Blood 02-01 @ 12:33      Physical Examination:  PULM: clear without wheee or rhonchi  CVS: Regular rate and rhythm, no murmurs, rubs, or gallops  ABD: Soft, non-tender  EXT:  No clubbing, cyanosis, or edema    RADIOLOGY REVIEWED  CXR:    CT chest:    TTE:

## 2018-02-04 NOTE — PROGRESS NOTE ADULT - SUBJECTIVE AND OBJECTIVE BOX
[facility] Cardiology Progress Note  _______________________________    Pt. seen and examined. No new cardiac-related complaints.    Telemetry - sinus 60-70, PVC, bigmen, brief PAT yest.    T(C): 36.8 (02-04-18 @ 05:05), Max: 37.1 (02-03-18 @ 20:39)  HR: 77 (02-04-18 @ 05:05) (73 - 82)  BP: 124/62 (02-04-18 @ 05:05) (123/59 - 135/66)  RR: 18 (02-04-18 @ 05:05) (17 - 18)  SpO2: 96% (02-04-18 @ 05:05) (96% - 98%)  I&O's Summary    03 Feb 2018 07:01  -  04 Feb 2018 07:00  --------------------------------------------------------  IN: 440 mL / OUT: 0 mL / NET: 440 mL        PHYSICAL EXAM:  GENERAL: Alert, NAD  NECK: Supple, No JVD, No carotid bruit.  CHEST/LUNG: scattered crackles.  HEART: S1 S2 normal, RRR,  No murmurs, rubs, or gallops  ABDOMEN: Soft, Nontender, Nondistended; Bowel sounds present  EXTREMITIES:  No LE edema.    LABS:                        8.9    9.31  )-----------( 277      ( 04 Feb 2018 08:42 )             27.6     02-04    137  |  104  |  32<H>  ----------------------------<  99  4.9   |  19<L>  |  1.13    Ca    8.7      04 Feb 2018 08:28  Phos  3.6     02-03  Mg     2.0     02-03        CARDIAC MARKERS ( 02 Feb 2018 02:59 )  x     / 0.11 ng/mL / x     / x     / x      CARDIAC MARKERS ( 01 Feb 2018 21:51 )  x     / 0.13 ng/mL / 222 U/L / x     / 4.2 ng/mL  CARDIAC MARKERS ( 01 Feb 2018 14:55 )  x     / 0.10 ng/mL / 257 U/L / x     / 4.0 ng/mL  CARDIAC MARKERS ( 01 Feb 2018 07:22 )  .372 ng/mL / x     / 292 U/L / x     / 2.1 ng/mL          MEDICATIONS  (STANDING):  ALBUTerol/ipratropium for Nebulization 3 milliLiter(s) Nebulizer every 6 hours  atorvastatin 10 milliGRAM(s) Oral at bedtime  cefTRIAXone   IVPB      cefTRIAXone   IVPB 1 Gram(s) IV Intermittent every 24 hours  latanoprost 0.005% Ophthalmic Solution 1 Drop(s) Both EYES at bedtime  levothyroxine 125 MICROGram(s) Oral daily  oseltamivir 30 milliGRAM(s) Oral daily  tamsulosin 0.4 milliGRAM(s) Oral at bedtime    MEDICATIONS  (PRN):  acetaminophen   Tablet 650 milliGRAM(s) Oral every 6 hours PRN For Temp greater than 38 C (100.4 F)  guaiFENesin   Syrup  (Sugar-Free) 100 milliGRAM(s) Oral every 6 hours PRN Cough  ondansetron Injectable 4 milliGRAM(s) IV Push every 6 hours PRN Nausea      RADIOLOGY & ADDITIONAL TESTS:

## 2018-02-05 DIAGNOSIS — R55 SYNCOPE AND COLLAPSE: ICD-10-CM

## 2018-02-05 DIAGNOSIS — N39.0 URINARY TRACT INFECTION, SITE NOT SPECIFIED: ICD-10-CM

## 2018-02-05 DIAGNOSIS — E03.9 HYPOTHYROIDISM, UNSPECIFIED: ICD-10-CM

## 2018-02-05 DIAGNOSIS — N17.9 ACUTE KIDNEY FAILURE, UNSPECIFIED: ICD-10-CM

## 2018-02-05 DIAGNOSIS — N40.1 BENIGN PROSTATIC HYPERPLASIA WITH LOWER URINARY TRACT SYMPTOMS: ICD-10-CM

## 2018-02-05 LAB
ANION GAP SERPL CALC-SCNC: 14 MMOL/L — SIGNIFICANT CHANGE UP (ref 5–17)
BUN SERPL-MCNC: 34 MG/DL — HIGH (ref 7–23)
CALCIUM SERPL-MCNC: 9 MG/DL — SIGNIFICANT CHANGE UP (ref 8.4–10.5)
CHLORIDE SERPL-SCNC: 103 MMOL/L — SIGNIFICANT CHANGE UP (ref 96–108)
CO2 SERPL-SCNC: 22 MMOL/L — SIGNIFICANT CHANGE UP (ref 22–31)
CREAT SERPL-MCNC: 1.07 MG/DL — SIGNIFICANT CHANGE UP (ref 0.5–1.3)
CULTURE RESULTS: NO GROWTH — SIGNIFICANT CHANGE UP
GLUCOSE SERPL-MCNC: 99 MG/DL — SIGNIFICANT CHANGE UP (ref 70–99)
HCT VFR BLD CALC: 28.6 % — LOW (ref 39–50)
HGB BLD-MCNC: 8.8 G/DL — LOW (ref 13–17)
MCHC RBC-ENTMCNC: 28.4 PG — SIGNIFICANT CHANGE UP (ref 27–34)
MCHC RBC-ENTMCNC: 30.8 GM/DL — LOW (ref 32–36)
MCV RBC AUTO: 92.3 FL — SIGNIFICANT CHANGE UP (ref 80–100)
PLATELET # BLD AUTO: 260 K/UL — SIGNIFICANT CHANGE UP (ref 150–400)
POTASSIUM SERPL-MCNC: 4.7 MMOL/L — SIGNIFICANT CHANGE UP (ref 3.5–5.3)
POTASSIUM SERPL-SCNC: 4.7 MMOL/L — SIGNIFICANT CHANGE UP (ref 3.5–5.3)
RBC # BLD: 3.1 M/UL — LOW (ref 4.2–5.8)
RBC # FLD: 13.9 % — SIGNIFICANT CHANGE UP (ref 10.3–14.5)
SODIUM SERPL-SCNC: 139 MMOL/L — SIGNIFICANT CHANGE UP (ref 135–145)
SPECIMEN SOURCE: SIGNIFICANT CHANGE UP
WBC # BLD: 9.28 K/UL — SIGNIFICANT CHANGE UP (ref 3.8–10.5)
WBC # FLD AUTO: 9.28 K/UL — SIGNIFICANT CHANGE UP (ref 3.8–10.5)

## 2018-02-05 PROCEDURE — 93306 TTE W/DOPPLER COMPLETE: CPT | Mod: 26

## 2018-02-05 RX ADMIN — Medication 30 MILLIGRAM(S): at 17:58

## 2018-02-05 RX ADMIN — Medication 125 MICROGRAM(S): at 05:09

## 2018-02-05 RX ADMIN — Medication 3 MILLILITER(S): at 05:09

## 2018-02-05 RX ADMIN — Medication 3 MILLILITER(S): at 23:15

## 2018-02-05 RX ADMIN — ATORVASTATIN CALCIUM 10 MILLIGRAM(S): 80 TABLET, FILM COATED ORAL at 21:32

## 2018-02-05 RX ADMIN — TAMSULOSIN HYDROCHLORIDE 0.4 MILLIGRAM(S): 0.4 CAPSULE ORAL at 21:31

## 2018-02-05 RX ADMIN — LATANOPROST 1 DROP(S): 0.05 SOLUTION/ DROPS OPHTHALMIC; TOPICAL at 21:31

## 2018-02-05 RX ADMIN — Medication 30 MILLIGRAM(S): at 12:26

## 2018-02-05 RX ADMIN — Medication 3 MILLILITER(S): at 12:26

## 2018-02-05 RX ADMIN — CEFTRIAXONE 100 GRAM(S): 500 INJECTION, POWDER, FOR SOLUTION INTRAMUSCULAR; INTRAVENOUS at 21:31

## 2018-02-05 RX ADMIN — Medication 3 MILLILITER(S): at 17:57

## 2018-02-05 NOTE — PROGRESS NOTE ADULT - SUBJECTIVE AND OBJECTIVE BOX
denies fevers, chills, CP, dizziness, palpitations, or acute SOB    Vital Signs Last 24 Hrs  T(C): 36.7 (2018 04:37), Max: 36.7 (2018 04:37)  T(F): 98 (2018 04:37), Max: 98 (2018 04:37)  HR: 60 (2018 04:37) (60 - 97)  BP: 114/52 (2018 04:37) (106/56 - 114/52)  BP(mean): --  RR: 18 (2018 04:37) (17 - 18)  SpO2: 97% (2018 04:37) (96% - 99%)    GENERAL: NAD, AAOx3  HEAD:  Atraumatic, Normocephalic  EYES: EOMI  NECK: Supple, No JVD, No LAD  CHEST/LUNG: no wheezes or rales  HEART: Regular rate and rhythm; No murmurs, rubs, or gallops  ABDOMEN: Soft, Nontender, Nondistended; Bowel sounds present  EXTREMITIES:  2+ Peripheral Pulses, No LE edema  SKIN: No rashes or lesions  NEURO: nonfocal CN/motor/sensory/reflexes    LABS:                        8.8    9.28  )-----------( 260      ( 2018 07:17 )             28.6     02-05    139  |  103  |  34<H>  ----------------------------<  99  4.7   |  22  |  1.07    Ca    9.0      2018 09:12        CAPILLARY BLOOD GLUCOSE            Urinalysis Basic - ( 2018 15:33 )    Color: Yellow / Appearance: Turbid / S.016 / pH: x  Gluc: x / Ketone: Negative  / Bili: Negative / Urobili: Negative   Blood: x / Protein: 100 mg/dL / Nitrite: Negative   Leuk Esterase: Large / RBC: 10-25 /HPF / WBC >50 /HPF   Sq Epi: x / Non Sq Epi: x / Bacteria: Many /HPF

## 2018-02-05 NOTE — PROGRESS NOTE ADULT - SUBJECTIVE AND OBJECTIVE BOX
[facility] Cardiology Progress Note  _______________________________    Pt. seen and examined in the echo lab. No new cardiac-related complaints.    Telemetry - sinus 55-60, PVC, couplet.    T(C): 36.7 (18 @ 04:37), Max: 36.7 (18 @ 04:37)  HR: 60 (18 @ 04:37) (60 - 97)  BP: 114/52 (18 @ 04:37) (106/56 - 114/52)  RR: 18 (18 @ 04:37) (17 - 18)  SpO2: 97% (18 @ 04:37) (96% - 99%)  I&O's Summary    2018 07:01  -  2018 07:00  --------------------------------------------------------  IN: 300 mL / OUT: 200 mL / NET: 100 mL        PHYSICAL EXAM:  GENERAL: Alert, NAD  NECK: Supple, No JVD, No carotid bruit.  CHEST/LUNG: scattered crackles.  HEART: S1 S2 normal, RRR,  No murmurs, rubs, or gallops  ABDOMEN: Soft, Nontender, Nondistended; Bowel sounds present  EXTREMITIES:  No LE edema.    LABS:                        8.8    9.28  )-----------( 260      ( 2018 07:17 )             28.6     -    137  |  104  |  32<H>  ----------------------------<  99  4.9   |  19<L>  |  1.13    Ca    8.7      2018 08:28        CARDIAC MARKERS ( 2018 02:59 )  x     / 0.11 ng/mL / x     / x     / x      CARDIAC MARKERS ( 2018 21:51 )  x     / 0.13 ng/mL / 222 U/L / x     / 4.2 ng/mL  CARDIAC MARKERS ( 2018 14:55 )  x     / 0.10 ng/mL / 257 U/L / x     / 4.0 ng/mL  CARDIAC MARKERS ( 2018 07:22 )  .372 ng/mL / x     / 292 U/L / x     / 2.1 ng/mL          Urinalysis Basic - ( 2018 15:33 )    Color: Yellow / Appearance: Turbid / S.016 / pH: x  Gluc: x / Ketone: Negative  / Bili: Negative / Urobili: Negative   Blood: x / Protein: 100 mg/dL / Nitrite: Negative   Leuk Esterase: Large / RBC: 10-25 /HPF / WBC >50 /HPF   Sq Epi: x / Non Sq Epi: x / Bacteria: Many /HPF        MEDICATIONS  (STANDING):  ALBUTerol/ipratropium for Nebulization 3 milliLiter(s) Nebulizer every 6 hours  atorvastatin 10 milliGRAM(s) Oral at bedtime  cefTRIAXone   IVPB      cefTRIAXone   IVPB 1 Gram(s) IV Intermittent every 24 hours  latanoprost 0.005% Ophthalmic Solution 1 Drop(s) Both EYES at bedtime  levothyroxine 125 MICROGram(s) Oral daily  oseltamivir 30 milliGRAM(s) Oral daily  tamsulosin 0.4 milliGRAM(s) Oral at bedtime    MEDICATIONS  (PRN):  acetaminophen   Tablet 650 milliGRAM(s) Oral every 6 hours PRN For Temp greater than 38 C (100.4 F)  guaiFENesin   Syrup  (Sugar-Free) 100 milliGRAM(s) Oral every 6 hours PRN Cough  ondansetron Injectable 4 milliGRAM(s) IV Push every 6 hours PRN Nausea      RADIOLOGY & ADDITIONAL TESTS:

## 2018-02-05 NOTE — PROGRESS NOTE ADULT - PROBLEM SELECTOR PLAN 2
-  - No significant PAT overnight.  - will cont to monitor.  - Echo as noted above.    will follow.    Braulio Mireles M.D., [facility]  [phone]

## 2018-02-05 NOTE — PROGRESS NOTE ADULT - SUBJECTIVE AND OBJECTIVE BOX
Follow-up Pulm Progress Note  Oz Becerra MD  [phone]    No new respiratory events overnight.    OOB - feels well, denies SOB  Afebrile on ceftriaxone for Enterobacter UTI  O2 sat 97% on RA      Vital Signs Last 24 Hrs  T(C): 36.7 (05 Feb 2018 04:37), Max: 36.7 (05 Feb 2018 04:37)  T(F): 98 (05 Feb 2018 04:37), Max: 98 (05 Feb 2018 04:37)  HR: 60 (05 Feb 2018 04:37) (60 - 97)  BP: 114/52 (05 Feb 2018 04:37) (106/56 - 114/52)  BP(mean): --  RR: 18 (05 Feb 2018 04:37) (17 - 18)  SpO2: 97% (05 Feb 2018 04:37) (96% - 99%)                         8.8    9.28  )-----------( 260      ( 05 Feb 2018 07:17 )             28.6       02-05    139  |  103  |  34<H>  ----------------------------<  99  4.7   |  22  |  1.07    Ca    9.0      05 Feb 2018 09:12      -CULTURES:  Culture Results:   No growth to date. (02-01 @ 23:51)  Culture Results:   No growth to date. (02-01 @ 23:51)  Culture Results:   >100,000 CFU/ml Enterobacter cloacae/asburiae (02-01 @ 15:03)  Culture Results:   No growth to date. (02-01 @ 12:33)  Culture Results:   No growth to date. (02-01 @ 12:33)    Most recent blood culture -- 02-01 @ 23:51   -- -- .Blood Blood 02-01 @ 23:51  Most recent blood culture -- 02-01 @ 15:03   Enterobacter cloacae/absuria Enterobacter cloacae/absuria .Urine Clean Catch (Midstream) 02-01 @ 15:03  Most recent blood culture -- 02-01 @ 12:33   -- -- .Blood Blood 02-01 @ 12:33      Physical Examination:  PULM: clear without wheeze or rhonchi  CVS: Regular rate and rhythm, no murmurs, rubs, or gallops  ABD: Soft, non-tender  EXT:  No clubbing, cyanosis, or edema    RADIOLOGY REVIEWED  CXR:    CT chest:    TTE:

## 2018-02-06 LAB
ANION GAP SERPL CALC-SCNC: 10 MMOL/L — SIGNIFICANT CHANGE UP (ref 5–17)
BUN SERPL-MCNC: 33 MG/DL — HIGH (ref 7–23)
CALCIUM SERPL-MCNC: 8.9 MG/DL — SIGNIFICANT CHANGE UP (ref 8.4–10.5)
CHLORIDE SERPL-SCNC: 103 MMOL/L — SIGNIFICANT CHANGE UP (ref 96–108)
CO2 SERPL-SCNC: 25 MMOL/L — SIGNIFICANT CHANGE UP (ref 22–31)
CREAT SERPL-MCNC: 1.34 MG/DL — HIGH (ref 0.5–1.3)
CULTURE RESULTS: SIGNIFICANT CHANGE UP
CULTURE RESULTS: SIGNIFICANT CHANGE UP
GLUCOSE BLDC GLUCOMTR-MCNC: 108 MG/DL — HIGH (ref 70–99)
GLUCOSE SERPL-MCNC: 98 MG/DL — SIGNIFICANT CHANGE UP (ref 70–99)
HCT VFR BLD CALC: 27.5 % — LOW (ref 39–50)
HGB BLD-MCNC: 8.5 G/DL — LOW (ref 13–17)
MCHC RBC-ENTMCNC: 28.1 PG — SIGNIFICANT CHANGE UP (ref 27–34)
MCHC RBC-ENTMCNC: 30.9 GM/DL — LOW (ref 32–36)
MCV RBC AUTO: 90.8 FL — SIGNIFICANT CHANGE UP (ref 80–100)
PLATELET # BLD AUTO: 268 K/UL — SIGNIFICANT CHANGE UP (ref 150–400)
POTASSIUM SERPL-MCNC: 5 MMOL/L — SIGNIFICANT CHANGE UP (ref 3.5–5.3)
POTASSIUM SERPL-SCNC: 5 MMOL/L — SIGNIFICANT CHANGE UP (ref 3.5–5.3)
RBC # BLD: 3.03 M/UL — LOW (ref 4.2–5.8)
RBC # FLD: 14 % — SIGNIFICANT CHANGE UP (ref 10.3–14.5)
SODIUM SERPL-SCNC: 138 MMOL/L — SIGNIFICANT CHANGE UP (ref 135–145)
SPECIMEN SOURCE: SIGNIFICANT CHANGE UP
SPECIMEN SOURCE: SIGNIFICANT CHANGE UP
WBC # BLD: 9.06 K/UL — SIGNIFICANT CHANGE UP (ref 3.8–10.5)
WBC # FLD AUTO: 9.06 K/UL — SIGNIFICANT CHANGE UP (ref 3.8–10.5)

## 2018-02-06 RX ORDER — METOPROLOL TARTRATE 50 MG
25 TABLET ORAL DAILY
Qty: 0 | Refills: 0 | Status: DISCONTINUED | OUTPATIENT
Start: 2018-02-06 | End: 2018-02-09

## 2018-02-06 RX ADMIN — TAMSULOSIN HYDROCHLORIDE 0.4 MILLIGRAM(S): 0.4 CAPSULE ORAL at 21:29

## 2018-02-06 RX ADMIN — LATANOPROST 1 DROP(S): 0.05 SOLUTION/ DROPS OPHTHALMIC; TOPICAL at 21:29

## 2018-02-06 RX ADMIN — Medication 3 MILLILITER(S): at 05:52

## 2018-02-06 RX ADMIN — Medication 3 MILLILITER(S): at 12:46

## 2018-02-06 RX ADMIN — Medication 25 MILLIGRAM(S): at 12:46

## 2018-02-06 RX ADMIN — Medication 125 MICROGRAM(S): at 05:52

## 2018-02-06 RX ADMIN — Medication 30 MILLIGRAM(S): at 05:53

## 2018-02-06 RX ADMIN — Medication 3 MILLILITER(S): at 18:44

## 2018-02-06 RX ADMIN — ATORVASTATIN CALCIUM 10 MILLIGRAM(S): 80 TABLET, FILM COATED ORAL at 21:29

## 2018-02-06 RX ADMIN — CEFTRIAXONE 100 GRAM(S): 500 INJECTION, POWDER, FOR SOLUTION INTRAMUSCULAR; INTRAVENOUS at 21:28

## 2018-02-06 NOTE — PROGRESS NOTE ADULT - SUBJECTIVE AND OBJECTIVE BOX
Still having urinary hesitancy  no fevers or chills    Vital Signs Last 24 Hrs  T(C): 36.8 (2018 04:03), Max: 36.9 (2018 20:44)  T(F): 98.2 (2018 04:03), Max: 98.5 (2018 20:44)  HR: 74 (2018 11:52) (59 - 74)  BP: 129/55 (2018 11:52) (118/56 - 129/55)  BP(mean): --  RR: 18 (2018 11:52) (18 - 18)  SpO2: 100% (2018 11:52) (96% - 100%)    GENERAL: NAD, AAOx3  HEAD:  Atraumatic, Normocephalic  EYES: EOMI  NECK: Supple, No JVD, No LAD  CHEST/LUNG: no wheezes or rales  HEART: Regular rate and rhythm; No murmurs, rubs, or gallops  ABDOMEN: Soft, Nontender, Nondistended; Bowel sounds present  EXTREMITIES:  2+ Peripheral Pulses, No LE edema  SKIN: No rashes or lesions  NEURO: nonfocal CN/motor/sensory/reflexes    LABS:                        8.5    9.06  )-----------( 268      ( 2018 07:07 )             27.5     02-06    138  |  103  |  33<H>  ----------------------------<  98  5.0   |  25  |  1.34<H>    Ca    8.9      2018 07:15        CAPILLARY BLOOD GLUCOSE            Urinalysis Basic - ( 2018 15:33 )    Color: Yellow / Appearance: Turbid / S.016 / pH: x  Gluc: x / Ketone: Negative  / Bili: Negative / Urobili: Negative   Blood: x / Protein: 100 mg/dL / Nitrite: Negative   Leuk Esterase: Large / RBC: 10-25 /HPF / WBC >50 /HPF   Sq Epi: x / Non Sq Epi: x / Bacteria: Many /HPF

## 2018-02-06 NOTE — PHYSICAL THERAPY INITIAL EVALUATION ADULT - PERTINENT HX OF CURRENT PROBLEM, REHAB EVAL
89yr male PMH dementia pt, HLD, HTN, melanoma in remission, prostate Ca in remission, hypothyroidism, CKD who was transferred from [facility] after 2 episodes of syncope found to have SDH and influenza. CT head: Similar-appearing subacute right frontoparietal subdural collection measuring 5 mm in greatest depth.

## 2018-02-06 NOTE — PROGRESS NOTE ADULT - PROBLEM SELECTOR PLAN 2
-  - sinus tach on telemetry  - will cont to monitor.    will follow.    Braulio Mireles M.D., [facility]  [phone]

## 2018-02-06 NOTE — PROGRESS NOTE ADULT - SUBJECTIVE AND OBJECTIVE BOX
Follow-up Pulm Progress Note  Oz Becerra MD  [phone]    No new respiratory events overnight.    OOB - feels well, denies SOB  Afebrile on ceftriaxone for Enterobacter UTI  O2 sat 97% on RA    Vital Signs Last 24 Hrs  T(C): 36.8 (06 Feb 2018 04:03), Max: 36.9 (05 Feb 2018 20:44)  T(F): 98.2 (06 Feb 2018 04:03), Max: 98.5 (05 Feb 2018 20:44)  HR: 74 (06 Feb 2018 11:52) (59 - 74)  BP: 129/55 (06 Feb 2018 11:52) (118/56 - 129/55)  BP(mean): --  RR: 18 (06 Feb 2018 11:52) (17 - 18)  SpO2: 100% (06 Feb 2018 11:52) (96% - 100%)                              8.5    9.06  )-----------( 268      ( 06 Feb 2018 07:07 )             27.5       02-06    138  |  103  |  33<H>  ----------------------------<  98  5.0   |  25  |  1.34<H>    Ca    8.9      06 Feb 2018 07:15        -CULTURES:  Culture Results:   No growth to date. (02-01 @ 23:51)  Culture Results:   No growth to date. (02-01 @ 23:51)  Culture Results:   >100,000 CFU/ml Enterobacter cloacae/asburiae (02-01 @ 15:03)  Culture Results:   No growth to date. (02-01 @ 12:33)  Culture Results:   No growth to date. (02-01 @ 12:33)    Most recent blood culture -- 02-01 @ 23:51   -- -- .Blood Blood 02-01 @ 23:51  Most recent blood culture -- 02-01 @ 15:03   Enterobacter cloacae/absuria Enterobacter cloacae/absuria .Urine Clean Catch (Midstream) 02-01 @ 15:03  Most recent blood culture -- 02-01 @ 12:33   -- -- .Blood Blood 02-01 @ 12:33      Physical Examination:  PULM: clear without wheeze or rhonchi  CVS: Regular rate and rhythm, no murmurs, rubs, or gallops  ABD: Soft, non-tender  EXT:  No clubbing, cyanosis, or edema    RADIOLOGY REVIEWED  CXR:    CT chest:    TTE:

## 2018-02-06 NOTE — PHYSICAL THERAPY INITIAL EVALUATION ADULT - ADDITIONAL COMMENTS
Pt lives in a private home with 6 steps to enter and an electric chair lift to enter second floor. Pt was independent with cane to ambulate and had an aide come to the home 7 days a week for 2 hrs in the morning (8:30-10:30am) and 2 hrs in the evening (4:30-6:30pm).

## 2018-02-06 NOTE — PROGRESS NOTE ADULT - SUBJECTIVE AND OBJECTIVE BOX
[facility] Cardiology Progress Note  _______________________________    Pt. seen and examined. No new cardiac-related complaints.    Telemetry - sinus 1st degree , PVC. Sinus 70 to 90 overnight.    T(C): 36.8 (18 @ 04:03), Max: 36.9 (18 @ 20:44)  HR: 59 (18 @ 04:03) (59 - 72)  BP: 118/56 (18 @ 04:03) (118/56 - 126/54)  RR: 18 (18 @ 04:03) (17 - 18)  SpO2: 96% (18 @ 04:03) (96% - 100%)  I&O's Summary    2018 07:01  -  2018 07:00  --------------------------------------------------------  IN: 880 mL / OUT: 0 mL / NET: 880 mL        PHYSICAL EXAM:  GENERAL: Alert, NAD.  NECK: Supple, No JVD, no carotid bruit.  CHEST/LUNG: Clear to auscultation bilaterally; No wheezes, rales, or rhonchi.  HEART: S1 S2 normal, RRR; No murmurs, rubs, or gallops  ABDOMEN: Soft, Nontender, Nondistended; Bowel sounds present  EXTREMITIES:  No LE edema.      LABS:                        8.8    9.28  )-----------( 260      ( 2018 07:17 )             28.6     02-    138  |  103  |  33<H>  ----------------------------<  98  5.0   |  25  |  1.34<H>    Ca    8.9      2018 07:15        CARDIAC MARKERS ( 2018 02:59 )  x     / 0.11 ng/mL / x     / x     / x      CARDIAC MARKERS ( 2018 21:51 )  x     / 0.13 ng/mL / 222 U/L / x     / 4.2 ng/mL  CARDIAC MARKERS ( 2018 14:55 )  x     / 0.10 ng/mL / 257 U/L / x     / 4.0 ng/mL  CARDIAC MARKERS ( 2018 07:22 )  .372 ng/mL / x     / 292 U/L / x     / 2.1 ng/mL          Urinalysis Basic - ( 2018 15:33 )    Color: Yellow / Appearance: Turbid / S.016 / pH: x  Gluc: x / Ketone: Negative  / Bili: Negative / Urobili: Negative   Blood: x / Protein: 100 mg/dL / Nitrite: Negative   Leuk Esterase: Large / RBC: 10-25 /HPF / WBC >50 /HPF   Sq Epi: x / Non Sq Epi: x / Bacteria: Many /HPF        MEDICATIONS  (STANDING):  ALBUTerol/ipratropium for Nebulization 3 milliLiter(s) Nebulizer every 6 hours  atorvastatin 10 milliGRAM(s) Oral at bedtime  cefTRIAXone   IVPB      cefTRIAXone   IVPB 1 Gram(s) IV Intermittent every 24 hours  latanoprost 0.005% Ophthalmic Solution 1 Drop(s) Both EYES at bedtime  levothyroxine 125 MICROGram(s) Oral daily  tamsulosin 0.4 milliGRAM(s) Oral at bedtime    MEDICATIONS  (PRN):  acetaminophen   Tablet 650 milliGRAM(s) Oral every 6 hours PRN For Temp greater than 38 C (100.4 F)  guaiFENesin   Syrup  (Sugar-Free) 100 milliGRAM(s) Oral every 6 hours PRN Cough  ondansetron Injectable 4 milliGRAM(s) IV Push every 6 hours PRN Nausea      RADIOLOGY & ADDITIONAL TESTS:  < from: Transthoracic Echocardiogram (18 @ 08:10) >  Conclusions:  1. Mitral annular calcification. Mild-moderate, posteriorly  directed mitral regurgitation.  2. Calcified trileaflet aortic valve with normal opening.  No aortic valve regurgitation seen.  3. Mild-moderate segmental left ventricular systolic  dysfunction. The mid to basal inferior, inferolateral and  inferoseptal walls are hypokinetic.  4. The right ventricle is not well visualized; grossly  normal right ventricular systolic function.  *** Compared with echocardiogram of 4/3/2009, wall motion  abnormalities are new adn mitral regurgitation is slightly  worse.  ------------------------------------------------------------------------  Confirmed on  2018 - 11:04:03 by Ulises Abraham M.D.  ------------------------------------------------------------------------    < end of copied text >

## 2018-02-06 NOTE — PHYSICAL THERAPY INITIAL EVALUATION ADULT - CRITERIA FOR SKILLED THERAPEUTIC INTERVENTIONS
anticipated discharge recommendation/home with no skilled PT home with previous level of assist, no skilled PT/anticipated discharge recommendation

## 2018-02-06 NOTE — PROGRESS NOTE ADULT - PROBLEM SELECTOR PLAN 2
no need for surgical intervention  stable on serial CT imaging  appreciate neurosurgery  hold off anti-plts and blood thinners

## 2018-02-07 LAB
ANION GAP SERPL CALC-SCNC: 9 MMOL/L — SIGNIFICANT CHANGE UP (ref 5–17)
BUN SERPL-MCNC: 33 MG/DL — HIGH (ref 7–23)
CALCIUM SERPL-MCNC: 8.5 MG/DL — SIGNIFICANT CHANGE UP (ref 8.4–10.5)
CHLORIDE SERPL-SCNC: 107 MMOL/L — SIGNIFICANT CHANGE UP (ref 96–108)
CO2 SERPL-SCNC: 24 MMOL/L — SIGNIFICANT CHANGE UP (ref 22–31)
CREAT SERPL-MCNC: 1.2 MG/DL — SIGNIFICANT CHANGE UP (ref 0.5–1.3)
CULTURE RESULTS: SIGNIFICANT CHANGE UP
CULTURE RESULTS: SIGNIFICANT CHANGE UP
GLUCOSE SERPL-MCNC: 91 MG/DL — SIGNIFICANT CHANGE UP (ref 70–99)
HCT VFR BLD CALC: 26.8 % — LOW (ref 39–50)
HGB BLD-MCNC: 8.5 G/DL — LOW (ref 13–17)
MCHC RBC-ENTMCNC: 29.3 PG — SIGNIFICANT CHANGE UP (ref 27–34)
MCHC RBC-ENTMCNC: 31.7 GM/DL — LOW (ref 32–36)
MCV RBC AUTO: 92.4 FL — SIGNIFICANT CHANGE UP (ref 80–100)
PLATELET # BLD AUTO: 281 K/UL — SIGNIFICANT CHANGE UP (ref 150–400)
POTASSIUM SERPL-MCNC: 5.1 MMOL/L — SIGNIFICANT CHANGE UP (ref 3.5–5.3)
POTASSIUM SERPL-SCNC: 5.1 MMOL/L — SIGNIFICANT CHANGE UP (ref 3.5–5.3)
RBC # BLD: 2.9 M/UL — LOW (ref 4.2–5.8)
RBC # FLD: 14.2 % — SIGNIFICANT CHANGE UP (ref 10.3–14.5)
SODIUM SERPL-SCNC: 140 MMOL/L — SIGNIFICANT CHANGE UP (ref 135–145)
SPECIMEN SOURCE: SIGNIFICANT CHANGE UP
SPECIMEN SOURCE: SIGNIFICANT CHANGE UP
WBC # BLD: 9.38 K/UL — SIGNIFICANT CHANGE UP (ref 3.8–10.5)
WBC # FLD AUTO: 9.38 K/UL — SIGNIFICANT CHANGE UP (ref 3.8–10.5)

## 2018-02-07 RX ADMIN — Medication 3 MILLILITER(S): at 00:19

## 2018-02-07 RX ADMIN — Medication 3 MILLILITER(S): at 11:59

## 2018-02-07 RX ADMIN — Medication 3 MILLILITER(S): at 17:05

## 2018-02-07 RX ADMIN — TAMSULOSIN HYDROCHLORIDE 0.4 MILLIGRAM(S): 0.4 CAPSULE ORAL at 19:49

## 2018-02-07 RX ADMIN — LATANOPROST 1 DROP(S): 0.05 SOLUTION/ DROPS OPHTHALMIC; TOPICAL at 19:49

## 2018-02-07 RX ADMIN — Medication 3 MILLILITER(S): at 05:18

## 2018-02-07 RX ADMIN — ATORVASTATIN CALCIUM 10 MILLIGRAM(S): 80 TABLET, FILM COATED ORAL at 19:48

## 2018-02-07 RX ADMIN — Medication 25 MILLIGRAM(S): at 09:12

## 2018-02-07 RX ADMIN — Medication 125 MICROGRAM(S): at 05:18

## 2018-02-07 RX ADMIN — CEFTRIAXONE 100 GRAM(S): 500 INJECTION, POWDER, FOR SOLUTION INTRAMUSCULAR; INTRAVENOUS at 19:48

## 2018-02-07 NOTE — PROGRESS NOTE ADULT - PROBLEM SELECTOR PLAN 2
-  - some PAT, otherwise no significant telemetry events.  - will cont to monitor.    will follow.    Braulio Mireles M.D., [facility]  [phone] -  - No significant telemetry events.  - will follow    Braulio Mireles M.D., [facility]  [phone]

## 2018-02-07 NOTE — PROGRESS NOTE ADULT - SUBJECTIVE AND OBJECTIVE BOX
Follow-up Pulm Progress Note  Oz Becerra MD  [phone]    No new respiratory events overnight.    OOB - breathing comfortably on RA  Afebrile on ceftriaxone for Enterobacter UTI  O2 sat 97% on RA    Vital Signs Last 24 Hrs  T(C): 36.9 (07 Feb 2018 04:34), Max: 36.9 (06 Feb 2018 20:32)  T(F): 98.4 (07 Feb 2018 04:34), Max: 98.4 (06 Feb 2018 20:32)  HR: 63 (07 Feb 2018 09:01) (57 - 91)  BP: 120/57 (07 Feb 2018 09:01) (108/54 - 134/72)  BP(mean): --  RR: 18 (07 Feb 2018 04:34) (18 - 18)  SpO2: 97% (07 Feb 2018 04:34) (97% - 99%)                        8.5    9.38  )-----------( 281      ( 07 Feb 2018 07:32 )             26.8     02-07    140  |  107  |  33<H>  ----------------------------<  91  5.1   |  24  |  1.20    Ca    8.5      07 Feb 2018 07:21        -CULTURES:  Culture Results:   No growth to date. (02-01 @ 23:51)  Culture Results:   No growth to date. (02-01 @ 23:51)  Culture Results:   >100,000 CFU/ml Enterobacter cloacae/asburiae (02-01 @ 15:03)  Culture Results:   No growth to date. (02-01 @ 12:33)  Culture Results:   No growth to date. (02-01 @ 12:33)    Most recent blood culture -- 02-01 @ 23:51   -- -- .Blood Blood 02-01 @ 23:51  Most recent blood culture -- 02-01 @ 15:03   Enterobacter cloacae/absuria Enterobacter cloacae/absuria .Urine Clean Catch (Midstream) 02-01 @ 15:03  Most recent blood culture -- 02-01 @ 12:33   -- -- .Blood Blood 02-01 @ 12:33      Physical Examination:  PULM: clear without wheeze or rhonchi  CVS: Regular rate and rhythm, no murmurs, rubs, or gallops  ABD: Soft, non-tender  EXT:  No clubbing, cyanosis, or edema    RADIOLOGY REVIEWED  CXR:    CT chest:    TTE:

## 2018-02-07 NOTE — PROGRESS NOTE ADULT - SUBJECTIVE AND OBJECTIVE BOX
No acute events overnight    Vital Signs Last 24 Hrs  T(C): 36.9 (07 Feb 2018 04:34), Max: 36.9 (06 Feb 2018 20:32)  T(F): 98.4 (07 Feb 2018 04:34), Max: 98.4 (06 Feb 2018 20:32)  HR: 63 (07 Feb 2018 09:01) (57 - 91)  BP: 120/57 (07 Feb 2018 09:01) (108/54 - 134/72)  BP(mean): --  RR: 18 (07 Feb 2018 04:34) (18 - 18)  SpO2: 97% (07 Feb 2018 04:34) (97% - 100%)    GENERAL: NAD, AAOx3  HEAD:  Atraumatic, Normocephalic  EYES: EOMI  NECK: Supple, No JVD, No LAD  CHEST/LUNG: no wheezes or rales  HEART: Regular rate and rhythm; nl S1/S2, no murmurs  ABDOMEN: Soft, NTND, NABS  EXTREMITIES:  2+ Peripheral Pulses, No LE edema  SKIN: No rashes or lesions  NEURO: nonfocal CN/motor/sensory/reflexes    LABS:                        8.5    9.38  )-----------( 281      ( 07 Feb 2018 07:32 )             26.8     02-07    140  |  107  |  33<H>  ----------------------------<  91  5.1   |  24  |  1.20    Ca    8.5      07 Feb 2018 07:21        CAPILLARY BLOOD GLUCOSE      POCT Blood Glucose.: 108 mg/dL (06 Feb 2018 17:05)

## 2018-02-07 NOTE — PROGRESS NOTE ADULT - SUBJECTIVE AND OBJECTIVE BOX
[facility] Cardiology Progress Note  _______________________________    Pt. seen and examined. No new cardiac-related complaints.    Telemetry -    T(C): 36.9 (02-07-18 @ 04:34), Max: 36.9 (02-06-18 @ 20:32)  HR: 63 (02-07-18 @ 09:01) (57 - 91)  BP: 120/57 (02-07-18 @ 09:01) (108/54 - 134/72)  RR: 18 (02-07-18 @ 04:34) (18 - 18)  SpO2: 97% (02-07-18 @ 04:34) (97% - 100%)  I&O's Summary    06 Feb 2018 07:01  -  07 Feb 2018 07:00  --------------------------------------------------------  IN: 1080 mL / OUT: 0 mL / NET: 1080 mL        PHYSICAL EXAM:  GENERAL: Alert, NAD.  NECK: Supple, No JVD, no carotid bruit.  CHEST/LUNG: Clear to auscultation bilaterally; No wheezes, rales, or rhonchi.  HEART: S1 S2 normal, RRR; No murmurs, rubs, or gallops  ABDOMEN: Soft, Nontender, Nondistended; Bowel sounds present  EXTREMITIES:  No LE edema.      LABS:                        8.5    9.06  )-----------( 268      ( 06 Feb 2018 07:07 )             27.5     02-07    140  |  107  |  33<H>  ----------------------------<  91  5.1   |  24  |  1.20    Ca    8.5      07 Feb 2018 07:21        CARDIAC MARKERS ( 02 Feb 2018 02:59 )  x     / 0.11 ng/mL / x     / x     / x      CARDIAC MARKERS ( 01 Feb 2018 21:51 )  x     / 0.13 ng/mL / 222 U/L / x     / 4.2 ng/mL  CARDIAC MARKERS ( 01 Feb 2018 14:55 )  x     / 0.10 ng/mL / 257 U/L / x     / 4.0 ng/mL  CARDIAC MARKERS ( 01 Feb 2018 07:22 )  .372 ng/mL / x     / 292 U/L / x     / 2.1 ng/mL            MEDICATIONS  (STANDING):  ALBUTerol/ipratropium for Nebulization 3 milliLiter(s) Nebulizer every 6 hours  atorvastatin 10 milliGRAM(s) Oral at bedtime  cefTRIAXone   IVPB      cefTRIAXone   IVPB 1 Gram(s) IV Intermittent every 24 hours  latanoprost 0.005% Ophthalmic Solution 1 Drop(s) Both EYES at bedtime  levothyroxine 125 MICROGram(s) Oral daily  metoprolol succinate ER 25 milliGRAM(s) Oral daily  tamsulosin 0.4 milliGRAM(s) Oral at bedtime    MEDICATIONS  (PRN):  acetaminophen   Tablet 650 milliGRAM(s) Oral every 6 hours PRN For Temp greater than 38 C (100.4 F)  guaiFENesin   Syrup  (Sugar-Free) 100 milliGRAM(s) Oral every 6 hours PRN Cough  ondansetron Injectable 4 milliGRAM(s) IV Push every 6 hours PRN Nausea      RADIOLOGY & ADDITIONAL TESTS: [facility] Cardiology Progress Note  _______________________________    Pt. seen and examined. No new cardiac-related complaints.    Telemetry - sinus 50-60's.    T(C): 36.9 (02-07-18 @ 04:34), Max: 36.9 (02-06-18 @ 20:32)  HR: 63 (02-07-18 @ 09:01) (57 - 91)  BP: 120/57 (02-07-18 @ 09:01) (108/54 - 134/72)  RR: 18 (02-07-18 @ 04:34) (18 - 18)  SpO2: 97% (02-07-18 @ 04:34) (97% - 100%)  I&O's Summary    06 Feb 2018 07:01  -  07 Feb 2018 07:00  --------------------------------------------------------  IN: 1080 mL / OUT: 0 mL / NET: 1080 mL        PHYSICAL EXAM:  GENERAL: Alert, NAD.  NECK: Supple, No JVD, no carotid bruit.  CHEST/LUNG: Clear to auscultation bilaterally; No wheezes, rales, or rhonchi.  HEART: S1 S2 normal, RRR; No murmurs, rubs, or gallops  ABDOMEN: Soft, Nontender, Nondistended; Bowel sounds present  EXTREMITIES:  No LE edema.      LABS:                        8.5    9.06  )-----------( 268      ( 06 Feb 2018 07:07 )             27.5     02-07    140  |  107  |  33<H>  ----------------------------<  91  5.1   |  24  |  1.20    Ca    8.5      07 Feb 2018 07:21        CARDIAC MARKERS ( 02 Feb 2018 02:59 )  x     / 0.11 ng/mL / x     / x     / x      CARDIAC MARKERS ( 01 Feb 2018 21:51 )  x     / 0.13 ng/mL / 222 U/L / x     / 4.2 ng/mL  CARDIAC MARKERS ( 01 Feb 2018 14:55 )  x     / 0.10 ng/mL / 257 U/L / x     / 4.0 ng/mL  CARDIAC MARKERS ( 01 Feb 2018 07:22 )  .372 ng/mL / x     / 292 U/L / x     / 2.1 ng/mL            MEDICATIONS  (STANDING):  ALBUTerol/ipratropium for Nebulization 3 milliLiter(s) Nebulizer every 6 hours  atorvastatin 10 milliGRAM(s) Oral at bedtime  cefTRIAXone   IVPB      cefTRIAXone   IVPB 1 Gram(s) IV Intermittent every 24 hours  latanoprost 0.005% Ophthalmic Solution 1 Drop(s) Both EYES at bedtime  levothyroxine 125 MICROGram(s) Oral daily  metoprolol succinate ER 25 milliGRAM(s) Oral daily  tamsulosin 0.4 milliGRAM(s) Oral at bedtime    MEDICATIONS  (PRN):  acetaminophen   Tablet 650 milliGRAM(s) Oral every 6 hours PRN For Temp greater than 38 C (100.4 F)  guaiFENesin   Syrup  (Sugar-Free) 100 milliGRAM(s) Oral every 6 hours PRN Cough  ondansetron Injectable 4 milliGRAM(s) IV Push every 6 hours PRN Nausea      RADIOLOGY & ADDITIONAL TESTS:

## 2018-02-08 LAB
ANION GAP SERPL CALC-SCNC: 13 MMOL/L — SIGNIFICANT CHANGE UP (ref 5–17)
BUN SERPL-MCNC: 35 MG/DL — HIGH (ref 7–23)
CALCIUM SERPL-MCNC: 8.6 MG/DL — SIGNIFICANT CHANGE UP (ref 8.4–10.5)
CHLORIDE SERPL-SCNC: 107 MMOL/L — SIGNIFICANT CHANGE UP (ref 96–108)
CO2 SERPL-SCNC: 22 MMOL/L — SIGNIFICANT CHANGE UP (ref 22–31)
CREAT SERPL-MCNC: 1.24 MG/DL — SIGNIFICANT CHANGE UP (ref 0.5–1.3)
GLUCOSE SERPL-MCNC: 91 MG/DL — SIGNIFICANT CHANGE UP (ref 70–99)
POTASSIUM SERPL-MCNC: 4.6 MMOL/L — SIGNIFICANT CHANGE UP (ref 3.5–5.3)
POTASSIUM SERPL-SCNC: 4.6 MMOL/L — SIGNIFICANT CHANGE UP (ref 3.5–5.3)
SODIUM SERPL-SCNC: 142 MMOL/L — SIGNIFICANT CHANGE UP (ref 135–145)

## 2018-02-08 RX ADMIN — Medication 125 MICROGRAM(S): at 06:18

## 2018-02-08 RX ADMIN — Medication 3 MILLILITER(S): at 06:18

## 2018-02-08 RX ADMIN — Medication 3 MILLILITER(S): at 17:59

## 2018-02-08 RX ADMIN — Medication 25 MILLIGRAM(S): at 06:18

## 2018-02-08 RX ADMIN — ATORVASTATIN CALCIUM 10 MILLIGRAM(S): 80 TABLET, FILM COATED ORAL at 19:33

## 2018-02-08 RX ADMIN — TAMSULOSIN HYDROCHLORIDE 0.4 MILLIGRAM(S): 0.4 CAPSULE ORAL at 19:33

## 2018-02-08 RX ADMIN — Medication 3 MILLILITER(S): at 12:20

## 2018-02-08 RX ADMIN — LATANOPROST 1 DROP(S): 0.05 SOLUTION/ DROPS OPHTHALMIC; TOPICAL at 19:33

## 2018-02-08 NOTE — PROGRESS NOTE ADULT - SUBJECTIVE AND OBJECTIVE BOX
No acute fevers, chills, or SOB    Vital Signs Last 24 Hrs  T(C): 36.5 (08 Feb 2018 04:42), Max: 36.9 (07 Feb 2018 14:25)  T(F): 97.7 (08 Feb 2018 04:42), Max: 98.4 (07 Feb 2018 14:25)  HR: 55 (08 Feb 2018 04:42) (55 - 61)  BP: 127/65 (08 Feb 2018 04:42) (113/46 - 127/65)  BP(mean): --  RR: 16 (08 Feb 2018 04:42) (16 - 18)  SpO2: 98% (08 Feb 2018 04:42) (96% - 98%)    GENERAL: NAD, AAOx3  HEAD:  Atraumatic, Normocephalic  EYES: EOMI  NECK: Supple, No JVD, No LAD  CHEST/LUNG: no wheezes or rales  HEART: Regular rate and rhythm; nl S1/S2, no murmurs  ABDOMEN: Soft, NTND, NABS  EXTREMITIES:  2+ Peripheral Pulses, No LE edema  SKIN: No rashes or lesions  NEURO: nonfocal CN/motor/sensory/reflexes    LABS:                        8.5    9.38  )-----------( 281      ( 07 Feb 2018 07:32 )             26.8     02-08    142  |  107  |  35<H>  ----------------------------<  91  4.6   |  22  |  1.24    Ca    8.6      08 Feb 2018 07:43        CAPILLARY BLOOD GLUCOSE

## 2018-02-08 NOTE — PROGRESS NOTE ADULT - PROBLEM SELECTOR PLAN 1
- Echo report and images reviewed - pt with new segmental wall motion abnormalities.  - no cardiac-related symptoms.  - No ASA or ischemic eval in setting of anemia, SDH, and recent flu.  - cont statin.  - cont toprol XL 25 mg.
- Echo report and images reviewed - pt with new segmental wall motion abnormalities.  - no cardiac-related symptoms.  - No ASA or ischemic eval in setting of anemia, SDH, and recent flu.  - cont statin.  - cont toprol XL 25 mg.  - will hold off on ACE-I for now.
- Echo report and images reviewed - pt with new segmental wall motion abnormalities.  - no cardiac-related symptoms.  - No ASA or ischemic eval in setting of anemia, SDH, and recent flu.  - cont statin.  - will start low dose BB toprol XL 25 mg.
- pt with elevated troponin in setting of CKD, influenza infection, and SDH  - no cardiac-related symptoms.  - Await echo to evaluate LV function.
- pt with elevated troponin in setting of CKD, influenza infection, and SDH  - no cardiac-related symptoms.  - Getting echo done now. Will follow-up images and report.
- pt with elevated troponin in setting of CKD, influenza infection, and SDH  - no significant ST changes on EKG on admission.  - check echo to evaluate LV function
- pt with elevated troponin in setting of CKD, influenza infection, and SDH  - no significant ST changes on EKG.  - check echo to evaluate LV function  - telemetry monitoring as noted.  - No role for AC given SDH.
7 days total ceftriaxone  urine cx already clear
no need for surgical intervention  stable on serial CT imaging  appreciate neurosurgery
s/p 7 days total ceftriaxone  urine cx already clear
7 days total ceftriaxone  urine cx already clear

## 2018-02-08 NOTE — PROGRESS NOTE ADULT - PROBLEM SELECTOR PROBLEM 6
Benign prostatic hyperplasia (BPH) with straining on urination

## 2018-02-08 NOTE — PROGRESS NOTE ADULT - PROBLEM SELECTOR PROBLEM 1
Elevated troponin
Subdural hematoma
UTI (urinary tract infection)

## 2018-02-08 NOTE — PROGRESS NOTE ADULT - SUBJECTIVE AND OBJECTIVE BOX
[facility] Cardiology Progress Note  _______________________________    Pt. seen and examined. No new cardiac-related complaints.    Telemetry - sinus karina 45-50,  sinus 50-60 overnight    T(C): 36.5 (02-08-18 @ 04:42), Max: 36.9 (02-07-18 @ 14:25)  HR: 55 (02-08-18 @ 04:42) (55 - 61)  BP: 127/65 (02-08-18 @ 04:42) (113/46 - 127/65)  RR: 16 (02-08-18 @ 04:42) (16 - 18)  SpO2: 98% (02-08-18 @ 04:42) (96% - 98%)  I&O's Summary    07 Feb 2018 07:01  -  08 Feb 2018 07:00  --------------------------------------------------------  IN: 1010 mL / OUT: 0 mL / NET: 1010 mL        PHYSICAL EXAM:  GENERAL: Alert, NAD.  NECK: Supple, No JVD, no carotid bruit.  CHEST/LUNG: Clear to auscultation bilaterally; No wheezes, rales, or rhonchi.  HEART: S1 S2 normal, RRR; No murmurs, rubs, or gallops  ABDOMEN: Soft, Nontender, Nondistended; Bowel sounds present  EXTREMITIES:  No LE edema.    LABS:                        8.5    9.38  )-----------( 281      ( 07 Feb 2018 07:32 )             26.8     02-08    142  |  107  |  35<H>  ----------------------------<  91  4.6   |  22  |  1.24    Ca    8.6      08 Feb 2018 07:43        CARDIAC MARKERS ( 02 Feb 2018 02:59 )  x     / 0.11 ng/mL / x     / x     / x      CARDIAC MARKERS ( 01 Feb 2018 21:51 )  x     / 0.13 ng/mL / 222 U/L / x     / 4.2 ng/mL  CARDIAC MARKERS ( 01 Feb 2018 14:55 )  x     / 0.10 ng/mL / 257 U/L / x     / 4.0 ng/mL            MEDICATIONS  (STANDING):  ALBUTerol/ipratropium for Nebulization 3 milliLiter(s) Nebulizer every 6 hours  atorvastatin 10 milliGRAM(s) Oral at bedtime  cefTRIAXone   IVPB      cefTRIAXone   IVPB 1 Gram(s) IV Intermittent every 24 hours  latanoprost 0.005% Ophthalmic Solution 1 Drop(s) Both EYES at bedtime  levothyroxine 125 MICROGram(s) Oral daily  metoprolol succinate ER 25 milliGRAM(s) Oral daily  tamsulosin 0.4 milliGRAM(s) Oral at bedtime    MEDICATIONS  (PRN):  acetaminophen   Tablet 650 milliGRAM(s) Oral every 6 hours PRN For Temp greater than 38 C (100.4 F)  guaiFENesin   Syrup  (Sugar-Free) 100 milliGRAM(s) Oral every 6 hours PRN Cough  ondansetron Injectable 4 milliGRAM(s) IV Push every 6 hours PRN Nausea      RADIOLOGY & ADDITIONAL TESTS:

## 2018-02-08 NOTE — PROGRESS NOTE ADULT - PROBLEM SELECTOR PLAN 4
no significant bradyarrhythmic events  had isolated PATs  appreciate cards

## 2018-02-08 NOTE — PROGRESS NOTE ADULT - PROBLEM SELECTOR PROBLEM 2
Subdural hematoma
Influenza
Subdural hematoma
Subdural hematoma
Syncope, unspecified syncope type

## 2018-02-08 NOTE — PROGRESS NOTE ADULT - SUBJECTIVE AND OBJECTIVE BOX
Follow-up Pulm Progress Note  Oz Becerra MD  [phone]    No new respiratory events overnight.    OOB - breathing comfortably on RA  Afebrile on ceftriaxone for Enterobacter UTI  O2 sat 97% on RA    Vital Signs Last 24 Hrs  T(C): 36.5 (08 Feb 2018 04:42), Max: 36.9 (07 Feb 2018 14:25)  T(F): 97.7 (08 Feb 2018 04:42), Max: 98.4 (07 Feb 2018 14:25)  HR: 55 (08 Feb 2018 04:42) (55 - 61)  BP: 127/65 (08 Feb 2018 04:42) (113/46 - 127/65)  BP(mean): --  RR: 16 (08 Feb 2018 04:42) (16 - 18)  SpO2: 98% (08 Feb 2018 04:42) (96% - 98%)                       8.5    9.38  )-----------( 281      ( 07 Feb 2018 07:32 )             26.8       02-08    142  |  107  |  35<H>  ----------------------------<  91  4.6   |  22  |  1.24    Ca    8.6      08 Feb 2018 07:43        -CULTURES:  Culture Results:   No growth to date. (02-01 @ 23:51)  Culture Results:   No growth to date. (02-01 @ 23:51)  Culture Results:   >100,000 CFU/ml Enterobacter cloacae/asburiae (02-01 @ 15:03)  Culture Results:   No growth to date. (02-01 @ 12:33)  Culture Results:   No growth to date. (02-01 @ 12:33)    Most recent blood culture -- 02-01 @ 23:51   -- -- .Blood Blood 02-01 @ 23:51  Most recent blood culture -- 02-01 @ 15:03   Enterobacter cloacae/absuria Enterobacter cloacae/absuria .Urine Clean Catch (Midstream) 02-01 @ 15:03  Most recent blood culture -- 02-01 @ 12:33   -- -- .Blood Blood 02-01 @ 12:33      Physical Examination:  PULM: clear without wheeze or rhonchi  CVS: Regular rate and rhythm, no murmurs, rubs, or gallops  ABD: Soft, non-tender  EXT:  No clubbing, cyanosis, or edema    RADIOLOGY REVIEWED  CXR:    CT chest:    TTE:

## 2018-02-08 NOTE — PROGRESS NOTE ADULT - ASSESSMENT
89 M h/o dementia, HLD, HTN, melanoma in remission, prostate Ca in remission, hypothyroidism, CKD a/w syncope and SDH in setting of influenza infection.
89-yo male with PMHx dementia, HLD, HTN, melanoma in remission, prostate Ca in remission, hypothyroidism, CKD who was transferred from [facility] after 2 episodes of syncope found to have SDH, UTI, PRANAV and influenza
89-yo male with PMHx dementia, HLD, HTN, melanoma in remission, prostate Ca in remission, hypothyroidism, CKD who was transferred from [facility] after 2 episodes of syncope found to have SDH, UTI, PRANAV and influenza
89yr male PMH dementia? pt aaox3 currently , HLD, HTN, melanoma in remission, prostate Ca in remission, hypothyroidism, CKD who was transferred from [facility] after 2 episodes of syncope found to have SDH and influenza    SDH - neurosurgery consult called and appreciated small 5mm bleed repeat ct stable.  ct image reviewed     Influenza - cont tamiflu duonebs supportive care monitor.  appreciate pulm input; no indication for antibx at this point    Syncope - Appreciate cards, TTE is pending, continue telemetry for now    UTI - ceftriaxone x 3 days    PRANAV on ckd 2/3 - cr improved monitor     BPH c/w flomax voiding     Hypothryoidism - c/w synthroid    DVT PPX
89yr male PMH dementia? pt aaox3 currently , HLD, HTN, melanoma in remission, prostate Ca in remission, hypothyroidism, CKD who was transferred from [facility] after 2 episodes of syncope found to have SDH, UTI, PRANAV and influenza
89yr male PMH dementia? pt aaox3 currently , HLD, HTN, melanoma in remission, prostate Ca in remission, hypothyroidism, CKD who was transferred from [facility] after 2 episodes of syncope found to have SDH and influenza    SDH - neurosurgery appreciated small 5mm bleed repeat ct stable.  ct image reviewed     UTI - repeat U/A and urine cx; bladder scan if low UOP -> he might need deshpande; ceftriaxone for total 7 days    Influenza - cont tamiflu duonebs supportive care monitor.  appreciate pulm input; no indication for antibx at this point    Syncope - Appreciate cards, TTE is pending, continue telemetry for now    PRANAV on ckd 2/3 - cr improved monitor     BPH c/w flomax; bladder scan if low UOP    Hypothryoidism - c/w synthroid    DVT PPX
INfluenza A viral infection  s/p fall/syncope in setting of influenza  ? UTI  No significant bronchospasm or evidence of pneumonia  SDH    REC    Supportive care  Deferr abx to primary team: no need for lung  Tamiflu for 5 days
INfluenza A viral infection  s/p fall/syncope in setting of influenza  ? UTI  No significant bronchospasm or evidence of pneumonia  SDH    REC    Supportive care  Deferr abx to primary team: no need for lung  Tamiflu for 5 days
INfluenza A viral infection: clinically resolving  s/p fall/syncope in setting of influenza  ? UTI  No significant bronchospasm or evidence of pneumonia  SDH    REC    Supportive care  Check O2 sats of RA  Deferr abx to primary team: no need for lung  Tamiflu for 5 days  DC planning primary team
INfluenza A viral infection: clinically resolving  s/p fall/syncope in setting of influenza  ? UTI  No significant bronchospasm or evidence of pneumonia  SDH    REC    Supportive care  Check O2 sats of RA  Deferr abx to primary team: no need for lung  Tamiflu for 5 days  Will not need bronchodilators on DC  DC planning primary team
89yr male PMH dementia? pt aaox3 currently , HLD, HTN, melanoma in remission, prostate Ca in remission, hypothyroidism, CKD who was transferred from [facility] after 2 episodes of syncope found to have SDH and influenza    SDH - neurosurgery consult called and appreciated small 5mm bleed repeat ct stable.  ct image reviewed     Influenza - start tamiflu duonebs supportive care monitor.  pulm consult called    Syncope - serial trops ekg trop minimally elevated echo ordered cardio consult called and appreciated     UTI - will treat for 3 days today day 2/3 pyuria, leuk esterase + RBC positive.      PRANAV on ckd 2/3 - cr improved monitor     BPH c/w flomax voiding     Hypothryoidism - c/w synthroid    DVT PPX
89-yo male with PMHx dementia, HLD, HTN, melanoma in remission, prostate Ca in remission, hypothyroidism, CKD who was transferred from [facility] after 2 episodes of syncope found to have SDH, UTI, PRANAV and influenza

## 2018-02-08 NOTE — PROGRESS NOTE ADULT - PROBLEM SELECTOR PLAN 3
has completed tamiflu x 5 days; cont duonebs  appreciate pulm  no need for antibx
7 days total ceftriaxone
cont tamiflu x 5 days and maxine  appreciate pulm  no need for antibx
has completed tamiflu x 5 days; cont duonebs  appreciate pulm  no need for antibx

## 2018-02-08 NOTE — PROGRESS NOTE ADULT - PROBLEM SELECTOR PLAN 2
-  - No significant telemetry events.  - will follow    Braulio Mireles M.D., [facility]  [phone]

## 2018-02-08 NOTE — PROGRESS NOTE ADULT - PROVIDER SPECIALTY LIST ADULT
Cardiology
Internal Medicine
Neurosurgery
Pulmonology
Internal Medicine

## 2018-02-09 ENCOUNTER — TRANSCRIPTION ENCOUNTER (OUTPATIENT)
Age: 83
End: 2018-02-09

## 2018-02-09 VITALS
SYSTOLIC BLOOD PRESSURE: 108 MMHG | OXYGEN SATURATION: 99 % | RESPIRATION RATE: 16 BRPM | HEART RATE: 59 BPM | DIASTOLIC BLOOD PRESSURE: 52 MMHG | TEMPERATURE: 98 F

## 2018-02-09 PROCEDURE — 85027 COMPLETE CBC AUTOMATED: CPT

## 2018-02-09 PROCEDURE — 80053 COMPREHEN METABOLIC PANEL: CPT

## 2018-02-09 PROCEDURE — 82553 CREATINE MB FRACTION: CPT

## 2018-02-09 PROCEDURE — 70450 CT HEAD/BRAIN W/O DYE: CPT

## 2018-02-09 PROCEDURE — 93005 ELECTROCARDIOGRAM TRACING: CPT

## 2018-02-09 PROCEDURE — 86901 BLOOD TYPING SEROLOGIC RH(D): CPT

## 2018-02-09 PROCEDURE — 81001 URINALYSIS AUTO W/SCOPE: CPT

## 2018-02-09 PROCEDURE — 80048 BASIC METABOLIC PNL TOTAL CA: CPT

## 2018-02-09 PROCEDURE — 84100 ASSAY OF PHOSPHORUS: CPT

## 2018-02-09 PROCEDURE — 97161 PT EVAL LOW COMPLEX 20 MIN: CPT

## 2018-02-09 PROCEDURE — 87086 URINE CULTURE/COLONY COUNT: CPT

## 2018-02-09 PROCEDURE — 83735 ASSAY OF MAGNESIUM: CPT

## 2018-02-09 PROCEDURE — 82962 GLUCOSE BLOOD TEST: CPT

## 2018-02-09 PROCEDURE — 94640 AIRWAY INHALATION TREATMENT: CPT

## 2018-02-09 PROCEDURE — 82550 ASSAY OF CK (CPK): CPT

## 2018-02-09 PROCEDURE — 86900 BLOOD TYPING SEROLOGIC ABO: CPT

## 2018-02-09 PROCEDURE — 84145 PROCALCITONIN (PCT): CPT

## 2018-02-09 PROCEDURE — 87040 BLOOD CULTURE FOR BACTERIA: CPT

## 2018-02-09 PROCEDURE — 86850 RBC ANTIBODY SCREEN: CPT

## 2018-02-09 PROCEDURE — 93306 TTE W/DOPPLER COMPLETE: CPT

## 2018-02-09 PROCEDURE — 84484 ASSAY OF TROPONIN QUANT: CPT

## 2018-02-09 PROCEDURE — 99285 EMERGENCY DEPT VISIT HI MDM: CPT | Mod: 25

## 2018-02-09 RX ORDER — METOPROLOL TARTRATE 50 MG
1 TABLET ORAL
Qty: 30 | Refills: 0 | OUTPATIENT
Start: 2018-02-09 | End: 2018-03-10

## 2018-02-09 RX ORDER — TAMSULOSIN HYDROCHLORIDE 0.4 MG/1
1 CAPSULE ORAL
Qty: 30 | Refills: 0 | OUTPATIENT
Start: 2018-02-09 | End: 2018-03-10

## 2018-02-09 RX ORDER — TAMSULOSIN HYDROCHLORIDE 0.4 MG/1
1 CAPSULE ORAL
Qty: 0 | Refills: 0 | COMMUNITY
Start: 2018-02-09

## 2018-02-09 RX ORDER — ATORVASTATIN CALCIUM 80 MG/1
1 TABLET, FILM COATED ORAL
Qty: 30 | Refills: 0 | OUTPATIENT
Start: 2018-02-09 | End: 2018-03-10

## 2018-02-09 RX ADMIN — Medication 25 MILLIGRAM(S): at 05:09

## 2018-02-09 RX ADMIN — Medication 125 MICROGRAM(S): at 05:09

## 2018-02-09 NOTE — DISCHARGE NOTE ADULT - SECONDARY DIAGNOSIS.
Syncope Influenza Benign prostatic hyperplasia (BPH) with straining on urination Essential hypertension

## 2018-02-09 NOTE — DISCHARGE NOTE ADULT - CARE PROVIDER_API CALL
Steffany Bridges), Internal Medicine  [address] [place] [address]  Phone: [phone]  Fax: [phone]

## 2018-02-09 NOTE — DISCHARGE NOTE ADULT - HOME CARE AGENCY
[facility]  [phone]  A visiting nurse will call to schedule an appointment to visit you in your home within 48 hours of discharge.  Home health aide services are also requested.

## 2018-02-09 NOTE — DISCHARGE NOTE ADULT - PLAN OF CARE
stable completed a course of antibiotics HOME CARE INSTRUCTIONS  Have someone stay with you until you feel stable.  Do not drive, operate machinery, or play sports until your caregiver says it is okay.  Keep all follow-up appointments as directed by your caregiver.   Lie down right away if you start feeling like you might faint. Breathe deeply and steadily. Wait until all the symptoms have passed.Drink enough fluids to keep your urine clear or pale yellow.  If you are taking blood pressure or heart medicine, get up slowly, taking several minutes to sit and then stand. This can reduce dizziness.  SEEK IMMEDIATE MEDICAL CARE IF:  You have a severe headache.  You have unusual pain in the chest, abdomen, or back.  You are bleeding from the mouth or rectum, or you have black or tarry stool.  You have an irregular or very fast heartbeat.  You have pain with breathing.  You have repeated fainting or seizure-like jerking during an episode.  You faint when sitting or lying down.  You have confusion.  You have difficulty walking.  You have severe weakness.  You have vision problems.  If you fainted, call your local emergency services (_____________________). Do not drive yourself to the hospital complete course of tamiflu c/w current meds Follow up with your medical doctor to establish long term blood pressure treatment goals.

## 2018-02-09 NOTE — DISCHARGE NOTE ADULT - PATIENT PORTAL LINK FT
You can access the [facility] Patient Portal, offered by [facility], by registering with the following website: [email or web address]

## 2018-02-09 NOTE — DISCHARGE NOTE ADULT - HOSPITAL COURSE
89-yo male with PMHx dementia, HLD, HTN, melanoma in remission, prostate Ca in remission, hypothyroidism, CKD who was transferred from [facility] after 2 episodes of syncope found to have SDH, UTI, PRANAV and influenza. No surgical intervention for SDH. You have completed a course of antibiotics and tamiflu and stable for discharge.

## 2018-02-09 NOTE — DISCHARGE NOTE ADULT - MEDICATION SUMMARY - MEDICATIONS TO STOP TAKING
I will STOP taking the medications listed below when I get home from the hospital:    hydroCHLOROthiazide  -- orally once a day

## 2018-02-09 NOTE — DISCHARGE NOTE ADULT - CARE PLAN
Principal Discharge DX:	UTI (urinary tract infection)  Goal:	stable  Assessment and plan of treatment:	completed a course of antibiotics  Secondary Diagnosis:	Syncope  Goal:	stable  Assessment and plan of treatment:	HOME CARE INSTRUCTIONS  Have someone stay with you until you feel stable.  Do not drive, operate machinery, or play sports until your caregiver says it is okay.  Keep all follow-up appointments as directed by your caregiver.   Lie down right away if you start feeling like you might faint. Breathe deeply and steadily. Wait until all the symptoms have passed.Drink enough fluids to keep your urine clear or pale yellow.  If you are taking blood pressure or heart medicine, get up slowly, taking several minutes to sit and then stand. This can reduce dizziness.  SEEK IMMEDIATE MEDICAL CARE IF:  You have a severe headache.  You have unusual pain in the chest, abdomen, or back.  You are bleeding from the mouth or rectum, or you have black or tarry stool.  You have an irregular or very fast heartbeat.  You have pain with breathing.  You have repeated fainting or seizure-like jerking during an episode.  You faint when sitting or lying down.  You have confusion.  You have difficulty walking.  You have severe weakness.  You have vision problems.  If you fainted, call your local emergency services (_____________________). Do not drive yourself to the hospital  Secondary Diagnosis:	Influenza  Secondary Diagnosis:	Benign prostatic hyperplasia (BPH) with straining on urination  Secondary Diagnosis:	Essential hypertension Principal Discharge DX:	UTI (urinary tract infection)  Goal:	stable  Assessment and plan of treatment:	completed a course of antibiotics  Secondary Diagnosis:	Syncope  Goal:	stable  Assessment and plan of treatment:	HOME CARE INSTRUCTIONS  Have someone stay with you until you feel stable.  Do not drive, operate machinery, or play sports until your caregiver says it is okay.  Keep all follow-up appointments as directed by your caregiver.   Lie down right away if you start feeling like you might faint. Breathe deeply and steadily. Wait until all the symptoms have passed.Drink enough fluids to keep your urine clear or pale yellow.  If you are taking blood pressure or heart medicine, get up slowly, taking several minutes to sit and then stand. This can reduce dizziness.  SEEK IMMEDIATE MEDICAL CARE IF:  You have a severe headache.  You have unusual pain in the chest, abdomen, or back.  You are bleeding from the mouth or rectum, or you have black or tarry stool.  You have an irregular or very fast heartbeat.  You have pain with breathing.  You have repeated fainting or seizure-like jerking during an episode.  You faint when sitting or lying down.  You have confusion.  You have difficulty walking.  You have severe weakness.  You have vision problems.  If you fainted, call your local emergency services (_____________________). Do not drive yourself to the hospital  Secondary Diagnosis:	Influenza  Assessment and plan of treatment:	complete course of tamiflu  Secondary Diagnosis:	Benign prostatic hyperplasia (BPH) with straining on urination  Assessment and plan of treatment:	c/w current meds  Secondary Diagnosis:	Essential hypertension  Goal:	stable  Assessment and plan of treatment:	Follow up with your medical doctor to establish long term blood pressure treatment goals.

## 2018-02-09 NOTE — DISCHARGE NOTE ADULT - MEDICATION SUMMARY - MEDICATIONS TO TAKE
I will START or STAY ON the medications listed below when I get home from the hospital:    Dulcolax Laxative oral tablet  -- 1 tab(s) by mouth , As Needed  -- Indication: For constipation     tamsulosin 0.4 mg oral capsule  -- 1 cap(s) by mouth once a day (at bedtime)  -- Indication: For Bph     atorvastatin 10 mg oral tablet  -- 1 tab(s) by mouth once a day (at bedtime)  -- Indication: For Hld     metoprolol succinate 25 mg oral tablet, extended release  -- 1 tab(s) by mouth once a day  -- Indication: For Hypertension     latanoprost 0.005% ophthalmic solution  -- 1 drop(s) to both eyes once a day (in the evening)  -- Indication: For Eye drops     Synthroid 125 mcg (0.125 mg) oral tablet  -- 1 tab(s) by mouth once a day  -- Indication: For Hypothyroid

## 2019-04-22 NOTE — ED ADULT NURSE NOTE - OBJECTIVE STATEMENT
Pt is an 88 yo male BIBA after having a syncopal episode as per daughter while pt was sitting on the couch. Pt states he felt fuzzy and then woke up to his daughter calling EMS because he "looked funny". Pt denies any CP or SOB no N/V/D no cough fever or chills. As per EMS pt was hypotensive 80/40s while on scene, 18 G IV placed in pts right A/C and he was given 1 L of NS. Pt denies any complaints at this time. Pt states he is currently being tx for a UTI he is unsure what medication he is taking. He has a pmh of HTN and hypothyroid. No

## 2021-12-17 NOTE — H&P ADULT - PROBLEM SELECTOR PLAN 8
16-Dec-2021 17-Dec-2021 COuld be stress induced   Pt has CKD3   will get 2 more sets of troponin and ECG   SCD for prophylaxis .

## 2023-10-10 NOTE — PROVIDER CONTACT NOTE (CRITICAL VALUE NOTIFICATION) - NAME OF MD/NP/PA/DO NOTIFIED:
Lindsey Double Island Pedicle Flap Text: Due to geometric and functional constraints, a flap reconstruction was performed to reconstruct the defect.  To that end, adjacent tissue was incised and carried over to close the defect in the following manner: The defect edges were debeveled with a #15 scalpel blade.  Given the location of the defect, shape of the defect and the proximity to free margins a double island pedicle advancement flap was deemed most appropriate.  Using a sterile surgical marker, an appropriate advancement flap was drawn incorporating the defect, outlining the appropriate donor tissue and placing the expected incisions within the relaxed skin tension lines where possible.    The area thus outlined was incised deep to adipose tissue with a #15 scalpel blade.  The skin margins were undermined to an appropriate distance in all directions around the primary defect and laterally outward around the island pedicle utilizing iris scissors.  There was minimal undermining beneath the pedicle flap.

## 2025-01-18 NOTE — ED PROVIDER NOTE - CPE EDP RESP NORM
#MDD  #h/o schizoaffective disorder  Psychiatrist Dr. Corrales. Per chart review, has previously been prescribed Wellbutrin, risperidone, clonazepam, gabapentin; reports only taking Wellbutrin.   Home med: Wellbutrin SR 200mg BID, iSTOP with clonazepam filled monthly last filled 12/18 and per discussion today with patient's PCP he has most recently been rx'd risperidone, clonazepam, Wellbutrin (per the  shop last filled on 12/19/24)  - c/w buproprion 300mg qd (therapeutic interchange for Wellbutrin 150mg BID)   - c/w risperidone 0.5mg HS  - c/w clonazepam 0.5mg HS normal...

## 2025-04-15 NOTE — ED ADULT NURSE NOTE - PMH
Order placed   Basal cell carcinoma of skin    Essential hypertension    Hyperlipidemia    Hypothyroid